# Patient Record
Sex: FEMALE | Race: OTHER | HISPANIC OR LATINO
[De-identification: names, ages, dates, MRNs, and addresses within clinical notes are randomized per-mention and may not be internally consistent; named-entity substitution may affect disease eponyms.]

---

## 2018-02-13 ENCOUNTER — APPOINTMENT (OUTPATIENT)
Dept: FAMILY MEDICINE | Facility: CLINIC | Age: 63
End: 2018-02-13
Payer: COMMERCIAL

## 2018-02-13 VITALS
DIASTOLIC BLOOD PRESSURE: 78 MMHG | HEIGHT: 64 IN | TEMPERATURE: 97.7 F | SYSTOLIC BLOOD PRESSURE: 121 MMHG | WEIGHT: 153 LBS | HEART RATE: 61 BPM | BODY MASS INDEX: 26.12 KG/M2 | OXYGEN SATURATION: 98 %

## 2018-02-13 DIAGNOSIS — Z83.3 FAMILY HISTORY OF DIABETES MELLITUS: ICD-10-CM

## 2018-02-13 DIAGNOSIS — K76.0 FATTY (CHANGE OF) LIVER, NOT ELSEWHERE CLASSIFIED: ICD-10-CM

## 2018-02-13 DIAGNOSIS — E66.9 OBESITY, UNSPECIFIED: ICD-10-CM

## 2018-02-13 DIAGNOSIS — Z82.0 FAMILY HISTORY OF EPILEPSY AND OTHER DISEASES OF THE NERVOUS SYSTEM: ICD-10-CM

## 2018-02-13 DIAGNOSIS — E66.3 OVERWEIGHT: ICD-10-CM

## 2018-02-13 DIAGNOSIS — F17.200 NICOTINE DEPENDENCE, UNSPECIFIED, UNCOMPLICATED: ICD-10-CM

## 2018-02-13 DIAGNOSIS — Z87.891 PERSONAL HISTORY OF NICOTINE DEPENDENCE: ICD-10-CM

## 2018-02-13 DIAGNOSIS — Z78.9 OTHER SPECIFIED HEALTH STATUS: ICD-10-CM

## 2018-02-13 PROCEDURE — 94010 BREATHING CAPACITY TEST: CPT

## 2018-02-13 PROCEDURE — G0008: CPT

## 2018-02-13 PROCEDURE — 99213 OFFICE O/P EST LOW 20 MIN: CPT | Mod: 25

## 2018-02-13 PROCEDURE — 99386 PREV VISIT NEW AGE 40-64: CPT | Mod: 25

## 2018-02-13 PROCEDURE — 93000 ELECTROCARDIOGRAM COMPLETE: CPT

## 2018-02-13 PROCEDURE — 90686 IIV4 VACC NO PRSV 0.5 ML IM: CPT

## 2018-02-14 PROBLEM — E66.9 OBESITY: Noted: 2018-02-13

## 2018-02-14 PROBLEM — E66.3 OVERWEIGHT (BMI 25.0-29.9): Status: ACTIVE | Noted: 2018-02-14

## 2018-03-10 ENCOUNTER — APPOINTMENT (OUTPATIENT)
Dept: CARDIOLOGY | Facility: CLINIC | Age: 63
End: 2018-03-10
Payer: COMMERCIAL

## 2018-03-10 VITALS
SYSTOLIC BLOOD PRESSURE: 130 MMHG | HEIGHT: 62 IN | RESPIRATION RATE: 16 BRPM | WEIGHT: 152 LBS | DIASTOLIC BLOOD PRESSURE: 80 MMHG | HEART RATE: 59 BPM | BODY MASS INDEX: 27.97 KG/M2

## 2018-03-10 PROCEDURE — 99244 OFF/OP CNSLTJ NEW/EST MOD 40: CPT

## 2018-03-10 PROCEDURE — 93000 ELECTROCARDIOGRAM COMPLETE: CPT

## 2018-03-27 ENCOUNTER — APPOINTMENT (OUTPATIENT)
Dept: INTERNAL MEDICINE | Facility: CLINIC | Age: 63
End: 2018-03-27
Payer: COMMERCIAL

## 2018-03-27 VITALS
OXYGEN SATURATION: 98 % | HEIGHT: 62 IN | BODY MASS INDEX: 27.97 KG/M2 | HEART RATE: 63 BPM | DIASTOLIC BLOOD PRESSURE: 70 MMHG | TEMPERATURE: 97.5 F | SYSTOLIC BLOOD PRESSURE: 118 MMHG | WEIGHT: 152 LBS

## 2018-03-27 LAB
BILIRUB UR QL STRIP: NEGATIVE
GLUCOSE UR-MCNC: NEGATIVE
HCG UR QL: 0.2 EU/DL
HGB UR QL STRIP.AUTO: NORMAL
KETONES UR-MCNC: NEGATIVE
LEUKOCYTE ESTERASE UR QL STRIP: NORMAL
NITRITE UR QL STRIP: NEGATIVE
PH UR STRIP: 5.5
PROT UR STRIP-MCNC: NEGATIVE
SP GR UR STRIP: 1.01

## 2018-03-27 PROCEDURE — 36415 COLL VENOUS BLD VENIPUNCTURE: CPT

## 2018-03-27 PROCEDURE — 81003 URINALYSIS AUTO W/O SCOPE: CPT | Mod: QW

## 2018-03-27 PROCEDURE — 99214 OFFICE O/P EST MOD 30 MIN: CPT | Mod: 25

## 2018-03-28 LAB
ALBUMIN SERPL ELPH-MCNC: 4.3 G/DL
ALP BLD-CCNC: 62 U/L
ALT SERPL-CCNC: 17 U/L
ANION GAP SERPL CALC-SCNC: 13 MMOL/L
APPEARANCE: CLEAR
AST SERPL-CCNC: 19 U/L
BACTERIA: ABNORMAL
BASOPHILS # BLD AUTO: 0.03 K/UL
BASOPHILS NFR BLD AUTO: 0.5 %
BILIRUB SERPL-MCNC: 0.5 MG/DL
BILIRUBIN URINE: NEGATIVE
BLOOD URINE: NEGATIVE
BUN SERPL-MCNC: 25 MG/DL
CALCIUM SERPL-MCNC: 9.2 MG/DL
CHLORIDE SERPL-SCNC: 101 MMOL/L
CO2 SERPL-SCNC: 25 MMOL/L
COLOR: YELLOW
CREAT SERPL-MCNC: 0.75 MG/DL
EOSINOPHIL # BLD AUTO: 0.13 K/UL
EOSINOPHIL NFR BLD AUTO: 2.1 %
ERYTHROCYTE [SEDIMENTATION RATE] IN BLOOD BY WESTERGREN METHOD: 15 MM/HR
GLUCOSE QUALITATIVE U: NEGATIVE MG/DL
GLUCOSE SERPL-MCNC: 89 MG/DL
HCT VFR BLD CALC: 41.3 %
HGB BLD-MCNC: 13.9 G/DL
HYALINE CASTS: 3 /LPF
IMM GRANULOCYTES NFR BLD AUTO: 0.5 %
KETONES URINE: NEGATIVE
LEUKOCYTE ESTERASE URINE: ABNORMAL
LYMPHOCYTES # BLD AUTO: 2.19 K/UL
LYMPHOCYTES NFR BLD AUTO: 35.2 %
MAN DIFF?: NORMAL
MCHC RBC-ENTMCNC: 30.3 PG
MCHC RBC-ENTMCNC: 33.7 GM/DL
MCV RBC AUTO: 90.2 FL
MICROSCOPIC-UA: NORMAL
MONOCYTES # BLD AUTO: 0.38 K/UL
MONOCYTES NFR BLD AUTO: 6.1 %
NEUTROPHILS # BLD AUTO: 3.46 K/UL
NEUTROPHILS NFR BLD AUTO: 55.6 %
NITRITE URINE: NEGATIVE
PH URINE: 5
PLATELET # BLD AUTO: 248 K/UL
POTASSIUM SERPL-SCNC: 4.5 MMOL/L
PROT SERPL-MCNC: 7.3 G/DL
PROTEIN URINE: NEGATIVE MG/DL
RBC # BLD: 4.58 M/UL
RBC # FLD: 13.4 %
RED BLOOD CELLS URINE: 1 /HPF
SODIUM SERPL-SCNC: 139 MMOL/L
SPECIFIC GRAVITY URINE: 1.01
SQUAMOUS EPITHELIAL CELLS: 4 /HPF
UROBILINOGEN URINE: NEGATIVE MG/DL
WBC # FLD AUTO: 6.22 K/UL
WHITE BLOOD CELLS URINE: 8 /HPF

## 2018-03-29 LAB — BACTERIA UR CULT: NORMAL

## 2018-04-02 ENCOUNTER — FORM ENCOUNTER (OUTPATIENT)
Age: 63
End: 2018-04-02

## 2018-04-03 ENCOUNTER — APPOINTMENT (OUTPATIENT)
Dept: CT IMAGING | Facility: CLINIC | Age: 63
End: 2018-04-03
Payer: COMMERCIAL

## 2018-04-03 ENCOUNTER — OUTPATIENT (OUTPATIENT)
Dept: OUTPATIENT SERVICES | Facility: HOSPITAL | Age: 63
LOS: 1 days | End: 2018-04-03
Payer: COMMERCIAL

## 2018-04-03 ENCOUNTER — APPOINTMENT (OUTPATIENT)
Dept: CARDIOLOGY | Facility: CLINIC | Age: 63
End: 2018-04-03
Payer: COMMERCIAL

## 2018-04-03 DIAGNOSIS — R91.8 OTHER NONSPECIFIC ABNORMAL FINDING OF LUNG FIELD: ICD-10-CM

## 2018-04-03 DIAGNOSIS — R10.31 RIGHT LOWER QUADRANT PAIN: ICD-10-CM

## 2018-04-03 DIAGNOSIS — R10.813 RIGHT LOWER QUADRANT ABDOMINAL TENDERNESS: ICD-10-CM

## 2018-04-03 PROCEDURE — 78452 HT MUSCLE IMAGE SPECT MULT: CPT

## 2018-04-03 PROCEDURE — 74177 CT ABD & PELVIS W/CONTRAST: CPT | Mod: 26

## 2018-04-03 PROCEDURE — 71260 CT THORAX DX C+: CPT

## 2018-04-03 PROCEDURE — 71260 CT THORAX DX C+: CPT | Mod: 26

## 2018-04-03 PROCEDURE — 93015 CV STRESS TEST SUPVJ I&R: CPT

## 2018-04-03 PROCEDURE — 74177 CT ABD & PELVIS W/CONTRAST: CPT

## 2018-04-03 PROCEDURE — A9500: CPT

## 2018-04-05 RX ORDER — KIT FOR THE PREPARATION OF TECHNETIUM TC99M SESTAMIBI 1 MG/5ML
INJECTION, POWDER, LYOPHILIZED, FOR SOLUTION PARENTERAL
Refills: 0 | Status: COMPLETED | OUTPATIENT
Start: 2018-04-05

## 2018-04-05 RX ADMIN — KIT FOR THE PREPARATION OF TECHNETIUM TC99M SESTAMIBI 0: 1 INJECTION, POWDER, LYOPHILIZED, FOR SOLUTION PARENTERAL at 00:00

## 2018-04-11 ENCOUNTER — APPOINTMENT (OUTPATIENT)
Dept: PULMONOLOGY | Facility: CLINIC | Age: 63
End: 2018-04-11
Payer: COMMERCIAL

## 2018-04-11 ENCOUNTER — APPOINTMENT (OUTPATIENT)
Dept: INTERNAL MEDICINE | Facility: CLINIC | Age: 63
End: 2018-04-11

## 2018-04-11 VITALS
BODY MASS INDEX: 28.7 KG/M2 | DIASTOLIC BLOOD PRESSURE: 70 MMHG | HEART RATE: 74 BPM | HEIGHT: 61.5 IN | WEIGHT: 154 LBS | SYSTOLIC BLOOD PRESSURE: 130 MMHG | OXYGEN SATURATION: 98 %

## 2018-04-11 PROCEDURE — 94010 BREATHING CAPACITY TEST: CPT

## 2018-04-11 PROCEDURE — 99205 OFFICE O/P NEW HI 60 MIN: CPT | Mod: 25

## 2018-07-02 ENCOUNTER — APPOINTMENT (OUTPATIENT)
Dept: CARDIOLOGY | Facility: CLINIC | Age: 63
End: 2018-07-02

## 2018-09-21 ENCOUNTER — LABORATORY RESULT (OUTPATIENT)
Age: 63
End: 2018-09-21

## 2018-09-21 ENCOUNTER — NON-APPOINTMENT (OUTPATIENT)
Age: 63
End: 2018-09-21

## 2018-09-21 ENCOUNTER — APPOINTMENT (OUTPATIENT)
Dept: INTERNAL MEDICINE | Facility: CLINIC | Age: 63
End: 2018-09-21
Payer: COMMERCIAL

## 2018-09-21 VITALS
BODY MASS INDEX: 29.64 KG/M2 | HEART RATE: 74 BPM | TEMPERATURE: 97.7 F | DIASTOLIC BLOOD PRESSURE: 76 MMHG | HEIGHT: 61 IN | OXYGEN SATURATION: 96 % | SYSTOLIC BLOOD PRESSURE: 120 MMHG | WEIGHT: 157 LBS

## 2018-09-21 PROCEDURE — 99214 OFFICE O/P EST MOD 30 MIN: CPT | Mod: 25

## 2018-09-21 PROCEDURE — 36415 COLL VENOUS BLD VENIPUNCTURE: CPT

## 2018-09-21 PROCEDURE — 93000 ELECTROCARDIOGRAM COMPLETE: CPT

## 2018-09-21 PROCEDURE — G0444 DEPRESSION SCREEN ANNUAL: CPT

## 2018-09-22 LAB
25(OH)D3 SERPL-MCNC: 38 NG/ML
ALBUMIN SERPL ELPH-MCNC: 4.7 G/DL
ALP BLD-CCNC: 63 U/L
ALT SERPL-CCNC: 23 U/L
AMYLASE/CREAT SERPL: 94 U/L
ANION GAP SERPL CALC-SCNC: 13 MMOL/L
APPEARANCE: CLEAR
AST SERPL-CCNC: 26 U/L
BACTERIA: NEGATIVE
BASOPHILS # BLD AUTO: 0.03 K/UL
BASOPHILS NFR BLD AUTO: 0.5 %
BILIRUB SERPL-MCNC: 0.3 MG/DL
BILIRUBIN URINE: NEGATIVE
BLOOD URINE: NEGATIVE
BUN SERPL-MCNC: 16 MG/DL
CALCIUM SERPL-MCNC: 9.4 MG/DL
CHLORIDE SERPL-SCNC: 103 MMOL/L
CK SERPL-CCNC: 135 U/L
CO2 SERPL-SCNC: 25 MMOL/L
COLOR: YELLOW
CREAT SERPL-MCNC: 0.63 MG/DL
EOSINOPHIL # BLD AUTO: 0.21 K/UL
EOSINOPHIL NFR BLD AUTO: 3.2 %
ERYTHROCYTE [SEDIMENTATION RATE] IN BLOOD BY WESTERGREN METHOD: 10 MM/HR
GLUCOSE QUALITATIVE U: NEGATIVE MG/DL
GLUCOSE SERPL-MCNC: 88 MG/DL
HCT VFR BLD CALC: 41.9 %
HGB BLD-MCNC: 13.5 G/DL
IMM GRANULOCYTES NFR BLD AUTO: 0.5 %
KETONES URINE: NEGATIVE
LEUKOCYTE ESTERASE URINE: ABNORMAL
LPL SERPL-CCNC: 24 U/L
LYMPHOCYTES # BLD AUTO: 2.77 K/UL
LYMPHOCYTES NFR BLD AUTO: 41.7 %
MAGNESIUM SERPL-MCNC: 2.2 MG/DL
MAN DIFF?: NORMAL
MCHC RBC-ENTMCNC: 29.6 PG
MCHC RBC-ENTMCNC: 32.2 GM/DL
MCV RBC AUTO: 91.9 FL
MICROSCOPIC-UA: NORMAL
MONOCYTES # BLD AUTO: 0.38 K/UL
MONOCYTES NFR BLD AUTO: 5.7 %
NEUTROPHILS # BLD AUTO: 3.22 K/UL
NEUTROPHILS NFR BLD AUTO: 48.4 %
NITRITE URINE: NEGATIVE
PH URINE: 5.5
PLATELET # BLD AUTO: 220 K/UL
POTASSIUM SERPL-SCNC: 4.4 MMOL/L
PROT SERPL-MCNC: 7.4 G/DL
PROTEIN URINE: NEGATIVE MG/DL
RBC # BLD: 4.56 M/UL
RBC # FLD: 13.5 %
RED BLOOD CELLS URINE: 0 /HPF
SODIUM SERPL-SCNC: 141 MMOL/L
SPECIFIC GRAVITY URINE: 1.01
SQUAMOUS EPITHELIAL CELLS: 3 /HPF
T PALLIDUM AB SER QL IA: NEGATIVE
T4 FREE SERPL-MCNC: 1.2 NG/DL
TSH SERPL-ACNC: 2.3 UIU/ML
UROBILINOGEN URINE: NEGATIVE MG/DL
WBC # FLD AUTO: 6.64 K/UL
WHITE BLOOD CELLS URINE: 10 /HPF

## 2018-09-22 NOTE — HISTORY OF PRESENT ILLNESS
[FreeTextEntry8] : Here for evaluation of abdominal discomfort and bloating after eating.  She states she has had sx for a long time but lately getting worse..  No nausea, vomiting, diarrhea or weight loss.  No fever/chills.  SHe has not taken anything for her sx.  She states she does have a hx of PUD but has not followed up with GI recently.  She states she gets so bloated sometimes that she feels a little shortness of breath.  She denies chest pain, palpitations, or lower extremity edema.  She does report a lot of fatigue\par \par She states she has been getting some leg cramps at night especially in her left upper inner thigh.  She describes pain as twisting.  She denies trauma.\par \par She is here today with her

## 2018-09-22 NOTE — COUNSELING
[Behavioral health counseling provided] : behavioral health  [None] : None [Good understanding] : Patient has a good understanding of lifestyle changes and the steps needed to achieve self management goals [ - Behavioral Counseling for Obesity (Face-to-Face for 15 Minutes)] : Behavioral Counseling for Obesity (Face-to-Face for 15 Minutes) [ - Annual Depression Screening] : Annual Depression Screening

## 2018-09-22 NOTE — ASSESSMENT
[FreeTextEntry1] : \par Abdominal pain/bloating:\par -Will check labs\par -I adv she check H pylori breath test at lab\par -Will start omeprazole 40mg PO daily-she will start after doing breath test at lab\par -I have adv she get CT abd/pelvis\par -will refer to GI as she may need EGD\par -she is to notify me if sx persist or worsen\par -if sx worsen she should go to ER\par \par Fatigue/SOB with abdominal bloating:\par -EKG done today-sinus bradycardia at 56, no ST abnormaoities\par -will check labs\par -depression could be contributing\par \par Depression:\par -PHQ 9 score 8\par -she states she occasionally feels depressed but sx are mild\par -tx options discussed-initially she declined any tx\par -her  encouraged her to meet with pt-[facility] and I agreed this may be good idea\par -she will be referred to CM\par -she is to notify office if sx persist or worsen\par -f/u 4 weeks\par -counseling time 15 minutes\par \par Muscle cramps:\par -I advised she stay hydrated\par -will check labs\par \par Dyslipidemia:\par -her 10 year risk of ASCVD was 4.88\par -I advised low fat/low cholesterol diet\par -will repeat fasting lipids at next visit (she was not fasting today)\par \par STREETER/SOB/chest pain:\par -she reports she was told she had emphysema in the past\par -spirometry was normal\par -she was seen by cardiology and ECHO and nuclear stress test was ordered\par -she was referred to Pulmonary-she has appt 2018\par -if she gets chest pain or shortness of breath she is to call 911 and go to ER\par \par Pulmonary nodules:\par -recent CT chest showed small B/L nodules 3 mm\par \par Osteopenia:\par -DEXA 2017-osteopenia\par -she should be taking calcium + Vitamin D\par \par Overweight:\par -I adv low fat/low cholesterol diet and weight loss\par -she has hx of fatty liver\par \par Uterine fibroid:\par -referred to GYN for annual exam\par \par She reports hx of gastric ulcer in past\par -she states she had EGD is past\par -i adv she avoid meloxicam\par -I adv she see GI\par -I advised H pylori breath test\par \par HCM:\par \par CPE 2018\par \par EKG 2018\par \par Depression screenin2018 PHQ 9 score 8\par \par Flu shot:  advised today-she declined 2018\par \par Pneumovax: advised previously\par \par Tdap: advised previously\par \par Shingles vaccine: advised previously\par \par HIV testing offered: she declined 2018\par \par Hepatitis C screening: offered-she declined 2018\par \par Mammogram: 2018 BR 1\par \par GYN: referred  for annual exam previously\par \par DEXA: 2017-osteopenia\par \par Colonoscopy: 2017-Dr Vidal-she states benign colon polyps found\par \par F/U 4 weeks

## 2018-09-22 NOTE — PHYSICAL EXAM
[No Acute Distress] : no acute distress [Well Nourished] : well nourished [Well Developed] : well developed [Well-Appearing] : well-appearing [Normal Voice/Communication] : normal voice/communication [Normal Sclera/Conjunctiva] : normal sclera/conjunctiva [PERRL] : pupils equal round and reactive to light [Normal Oropharynx] : the oropharynx was normal [No JVD] : no jugular venous distention [Supple] : supple [No Lymphadenopathy] : no lymphadenopathy [Thyroid Normal, No Nodules] : the thyroid was normal and there were no nodules present [No Respiratory Distress] : no respiratory distress  [Clear to Auscultation] : lungs were clear to auscultation bilaterally [No Accessory Muscle Use] : no accessory muscle use [Normal Rate] : normal rate  [Regular Rhythm] : with a regular rhythm [Normal S1, S2] : normal S1 and S2 [No Murmur] : no murmur heard [No Abdominal Bruit] : a ~M bruit was not heard ~T in the abdomen [Pedal Pulses Present] : the pedal pulses are present [No Edema] : there was no peripheral edema [No Extremity Clubbing/Cyanosis] : no extremity clubbing/cyanosis [No Palpable Aorta] : no palpable aorta [Soft] : abdomen soft [Non Tender] : non-tender [Non-distended] : non-distended [No Masses] : no abdominal mass palpated [No HSM] : no HSM [Normal Bowel Sounds] : normal bowel sounds [No CVA Tenderness] : no CVA  tenderness [No Spinal Tenderness] : no spinal tenderness [No Joint Swelling] : no joint swelling [No Rash] : no rash [No Skin Lesions] : no skin lesions [No Focal Deficits] : no focal deficits [Normal Affect] : the affect was normal [Alert and Oriented x3] : oriented to person, place, and time [Normal Mood] : the mood was normal

## 2018-09-22 NOTE — REVIEW OF SYSTEMS
[Fatigue] : fatigue [Abdominal Pain] : abdominal pain [Depression] : depression [Negative] : Neurological [Fever] : no fever [Chills] : no chills [Recent Change In Weight] : ~T no recent weight change [Earache] : no earache [Nasal Discharge] : no nasal discharge [Sore Throat] : no sore throat [Chest Pain] : no chest pain [Palpitations] : no palpitations [Leg Claudication] : no leg claudication [Lower Ext Edema] : no lower extremity edema [Wheezing] : no wheezing [Cough] : no cough [Dyspnea on Exertion] : no dyspnea on exertion [Nausea] : no nausea [Constipation] : no constipation [Diarrhea] : diarrhea [Vomiting] : no vomiting [Heartburn] : no heartburn [Melena] : no melena [Skin Rash] : no skin rash [Anxiety] : no anxiety [FreeTextEntry6] : see HPI [FreeTextEntry7] : see HPI [FreeTextEntry9] : see HPI

## 2018-09-24 LAB
ANA PAT FLD IF-IMP: ABNORMAL
ANACR T: ABNORMAL

## 2018-10-07 ENCOUNTER — FORM ENCOUNTER (OUTPATIENT)
Age: 63
End: 2018-10-07

## 2018-10-08 ENCOUNTER — OUTPATIENT (OUTPATIENT)
Dept: OUTPATIENT SERVICES | Facility: HOSPITAL | Age: 63
LOS: 1 days | End: 2018-10-08
Payer: COMMERCIAL

## 2018-10-08 ENCOUNTER — APPOINTMENT (OUTPATIENT)
Dept: CT IMAGING | Facility: CLINIC | Age: 63
End: 2018-10-08
Payer: COMMERCIAL

## 2018-10-08 DIAGNOSIS — R14.0 ABDOMINAL DISTENSION (GASEOUS): ICD-10-CM

## 2018-10-08 DIAGNOSIS — R10.9 UNSPECIFIED ABDOMINAL PAIN: ICD-10-CM

## 2018-10-08 PROCEDURE — 74177 CT ABD & PELVIS W/CONTRAST: CPT | Mod: 26

## 2018-10-08 PROCEDURE — 82565 ASSAY OF CREATININE: CPT

## 2018-10-08 PROCEDURE — 74177 CT ABD & PELVIS W/CONTRAST: CPT

## 2018-10-19 ENCOUNTER — APPOINTMENT (OUTPATIENT)
Dept: INTERNAL MEDICINE | Facility: CLINIC | Age: 63
End: 2018-10-19

## 2018-12-10 ENCOUNTER — APPOINTMENT (OUTPATIENT)
Dept: GASTROENTEROLOGY | Facility: CLINIC | Age: 63
End: 2018-12-10
Payer: COMMERCIAL

## 2018-12-10 VITALS
WEIGHT: 156 LBS | RESPIRATION RATE: 14 BRPM | DIASTOLIC BLOOD PRESSURE: 78 MMHG | HEART RATE: 75 BPM | BODY MASS INDEX: 29.45 KG/M2 | HEIGHT: 61 IN | SYSTOLIC BLOOD PRESSURE: 118 MMHG

## 2018-12-10 PROCEDURE — 99204 OFFICE O/P NEW MOD 45 MIN: CPT

## 2018-12-10 PROCEDURE — 82272 OCCULT BLD FECES 1-3 TESTS: CPT

## 2018-12-18 ENCOUNTER — APPOINTMENT (OUTPATIENT)
Dept: INTERNAL MEDICINE | Facility: CLINIC | Age: 63
End: 2018-12-18

## 2019-03-04 ENCOUNTER — APPOINTMENT (OUTPATIENT)
Dept: GASTROENTEROLOGY | Facility: CLINIC | Age: 64
End: 2019-03-04

## 2019-07-24 ENCOUNTER — MEDICATION RENEWAL (OUTPATIENT)
Age: 64
End: 2019-07-24

## 2019-09-02 ENCOUNTER — FORM ENCOUNTER (OUTPATIENT)
Age: 64
End: 2019-09-02

## 2019-09-03 ENCOUNTER — OUTPATIENT (OUTPATIENT)
Dept: OUTPATIENT SERVICES | Facility: HOSPITAL | Age: 64
LOS: 1 days | End: 2019-09-03
Payer: COMMERCIAL

## 2019-09-03 ENCOUNTER — NON-APPOINTMENT (OUTPATIENT)
Age: 64
End: 2019-09-03

## 2019-09-03 ENCOUNTER — APPOINTMENT (OUTPATIENT)
Dept: RADIOLOGY | Facility: CLINIC | Age: 64
End: 2019-09-03
Payer: COMMERCIAL

## 2019-09-03 ENCOUNTER — APPOINTMENT (OUTPATIENT)
Dept: INTERNAL MEDICINE | Facility: CLINIC | Age: 64
End: 2019-09-03
Payer: COMMERCIAL

## 2019-09-03 ENCOUNTER — LABORATORY RESULT (OUTPATIENT)
Age: 64
End: 2019-09-03

## 2019-09-03 VITALS
SYSTOLIC BLOOD PRESSURE: 124 MMHG | OXYGEN SATURATION: 97 % | HEIGHT: 61 IN | WEIGHT: 157 LBS | TEMPERATURE: 98.1 F | DIASTOLIC BLOOD PRESSURE: 70 MMHG | HEART RATE: 63 BPM | RESPIRATION RATE: 14 BRPM | BODY MASS INDEX: 29.64 KG/M2

## 2019-09-03 DIAGNOSIS — R10.813 RIGHT LOWER QUADRANT ABDOMINAL TENDERNESS: ICD-10-CM

## 2019-09-03 DIAGNOSIS — Z92.29 PERSONAL HISTORY OF OTHER DRUG THERAPY: ICD-10-CM

## 2019-09-03 DIAGNOSIS — M54.6 PAIN IN THORACIC SPINE: ICD-10-CM

## 2019-09-03 PROCEDURE — 72100 X-RAY EXAM L-S SPINE 2/3 VWS: CPT | Mod: 26

## 2019-09-03 PROCEDURE — 72070 X-RAY EXAM THORAC SPINE 2VWS: CPT | Mod: 26

## 2019-09-03 PROCEDURE — 99396 PREV VISIT EST AGE 40-64: CPT | Mod: 25

## 2019-09-03 PROCEDURE — 99213 OFFICE O/P EST LOW 20 MIN: CPT | Mod: 25

## 2019-09-03 PROCEDURE — 72070 X-RAY EXAM THORAC SPINE 2VWS: CPT

## 2019-09-03 PROCEDURE — 96127 BRIEF EMOTIONAL/BEHAV ASSMT: CPT

## 2019-09-03 PROCEDURE — 36415 COLL VENOUS BLD VENIPUNCTURE: CPT

## 2019-09-03 PROCEDURE — 93000 ELECTROCARDIOGRAM COMPLETE: CPT

## 2019-09-03 PROCEDURE — 72100 X-RAY EXAM L-S SPINE 2/3 VWS: CPT

## 2019-09-03 RX ORDER — AMOXICILLIN 500 MG/1
500 TABLET, FILM COATED ORAL
Qty: 56 | Refills: 0 | Status: DISCONTINUED | COMMUNITY
Start: 2018-10-12 | End: 2019-09-03

## 2019-09-03 RX ORDER — CLARITHROMYCIN 500 MG/1
500 TABLET, FILM COATED ORAL
Qty: 28 | Refills: 0 | Status: DISCONTINUED | COMMUNITY
Start: 2018-10-12 | End: 2019-09-03

## 2019-09-03 RX ORDER — OMEPRAZOLE 40 MG/1
40 CAPSULE, DELAYED RELEASE ORAL
Qty: 28 | Refills: 0 | Status: DISCONTINUED | COMMUNITY
Start: 2018-09-21 | End: 2019-09-03

## 2019-09-03 NOTE — HISTORY OF PRESENT ILLNESS
[FreeTextEntry1] : CPE [de-identified] : Here for CPE. She is accompanied by her  today. She came in fasting. \par \par She states that she never followed back up with GI.   She states TOM ordered H pylori stool antigen test as she did not take PPI during her H pylori treatment.  She states she had h pylori stool test done at [facility] but states she is unsure of results\par \par She notes she occasionally feels depressed. She was previously referred to [facility] but never made appt. No SI/HI.  She states she is interested in meeting with [facility].  She does not want meds\par \par She reports that she was seen by cardiologist Dr. Rodriguez for STREETER/SOB/chest pain and work up was normal. \par \par She states chronic mid back/lower back pain has returned. She notes she had an MRI done before which showed a herniated disc. She states that she used to get injections for the pain which helped. She takes Advil or meloxicam prn which sometimes alleviates pain. She denies injuries or trauma. She denies radiation down legs, leg weakness, or parasthesias\par

## 2019-09-03 NOTE — ASSESSMENT
[FreeTextEntry1] : Chronic back pain:\par -will refer to PT\par -will renew meloxicam 15 mg PO daily prn-I advised she take sporadically-she reports she takes rarely  (R/B/A/side effects discussed)\par -will order x-ray of L spine and T spine \par -will check labs\par -she is to notify office if sx persist or worsen\par \par H pylori infection\par -she appears to have hx of gastric ulcer in past\par -she appears to have taken antibiotics but did not take PPI with tx\par -she was seen by GI and states she had h pylori stool antigen test done-will try to get results\par \par Fatigue/SOB\par -she states she was seen by cardiology and work up was negative\par -will call for cardiology consult note\par \par Depression:\par -PHQ 9 score 11\par -Tx options discussed\par -she does not want meds\par -her  encouraged her to meet with pt-[facility] and I agreed this may be good idea\par -she will be referred to [facility]\par -she is to notify office if sx persist or worsen\par -f/u 4 weeks\par \par Dyslipidemia:\par -will check fasting lipids\par \par STREETER/SOB/chest pain:\par -she states sx have resolved\par -she states she was sen by cardiology and work up was negative-will call for consult note\par \par Pulmonary nodules:\par -last CT chest showed small B/L nodules 3 mm\par -given smoking hx will order f/u  CT chest \par \par Osteopenia:\par -DEXA 2017-osteopenia\par -will order DEXA\par -calcium + Vitamin D advised\par \par Overweight:\par -BMI 29\par -I adv low fat/low cholesterol diet and weight loss\par -she has hx of fatty liver\par \par Uterine fibroid:\par -referred to GYN for annual exam\par \par \par HCM:\par \par CPE 9/3/2019\par \par EKG 9/3/2019 sinus bradycardia at 56, low volatge, no ST abnormalities\par \par Depression screenin/3/2019 PHQ 9 score 11\par \par Flu shot:  advised she get in fall\par \par Pneumovax: advised- she declined\par \par Tdap: advised- she declined\par \par Shingles vaccine: advised- she declined\par \par HIV testing offered: she declined 9/3/2019\par \par Hepatitis C screening: will check today\par \par Mammogram: 2018 BR 1-will refer today for annual mammogram\par \par GYN: referred today for annual exam \par \par DEXA: 2017-osteopenia- 9/3/2019 ordered today \par \par Colonoscopy: 2017-Dr Vidal-she states benign colon polyps found\par \par F/U 3 months. Labs drawn in office today.\par

## 2019-09-03 NOTE — COUNSELING
[Behavioral health counseling provided] : Behavioral health counseling provided [Benefits of weight loss discussed] : Benefits of weight loss discussed [Potential consequences of obesity discussed] : Potential consequences of obesity discussed [Good understanding] : Patient has a good understanding of disease, goals and obesity follow-up plan

## 2019-09-03 NOTE — HEALTH RISK ASSESSMENT
[No] : In the past 12 months have you used drugs other than those required for medical reasons? No [Unemployed] : unemployed [] :  [No falls in past year] : Patient reported no falls in the past year [HIV test declined] : HIV test declined [With Significant Other] : lives with significant other [de-identified] : Former smoker  [] : No [[id number]] : 11 [YearQuit] : 2015 [Change in mental status noted] : No change in mental status noted

## 2019-09-03 NOTE — REVIEW OF SYSTEMS
[Depression] : depression [Back Pain] : back pain [Negative] : Heme/Lymph [Fever] : no fever [Fatigue] : no fatigue [Chills] : no chills [Recent Change In Weight] : ~T no recent weight change [Chest Pain] : no chest pain [Palpitations] : no palpitations [Leg Claudication] : no leg claudication [Shortness Of Breath] : no shortness of breath [Lower Ext Edema] : no lower extremity edema [Wheezing] : no wheezing [Dyspnea on Exertion] : no dyspnea on exertion [Cough] : no cough [Abdominal Pain] : no abdominal pain [Nausea] : no nausea [Diarrhea] : diarrhea [Vomiting] : no vomiting [Suicidal] : not suicidal [Skin Rash] : no skin rash [Anxiety] : no anxiety [Insomnia] : no insomnia

## 2019-09-03 NOTE — END OF VISIT
[FreeTextEntry3] : All medical entries made by the Scribe were at my, Dr. Aristides An's, direction and personally dictated by me on [9/3/2019]. I have reviewed the chart and agree that the record accurately reflects my personal performance of the history, physical exam, assessment and plan. I have also personally directed, reviewed, and agreed with the chart.\par

## 2019-09-03 NOTE — PHYSICAL EXAM
[No Acute Distress] : no acute distress [Well Nourished] : well nourished [Well Developed] : well developed [Well-Appearing] : well-appearing [Normal Voice/Communication] : normal voice/communication [PERRL] : pupils equal round and reactive to light [Normal Sclera/Conjunctiva] : normal sclera/conjunctiva [Normal Oropharynx] : the oropharynx was normal [No JVD] : no jugular venous distention [Supple] : supple [No Lymphadenopathy] : no lymphadenopathy [Thyroid Normal, No Nodules] : the thyroid was normal and there were no nodules present [No Respiratory Distress] : no respiratory distress  [Clear to Auscultation] : lungs were clear to auscultation bilaterally [Normal Rate] : normal rate  [No Accessory Muscle Use] : no accessory muscle use [Regular Rhythm] : with a regular rhythm [Normal S1, S2] : normal S1 and S2 [No Murmur] : no murmur heard [No Abdominal Bruit] : a ~M bruit was not heard ~T in the abdomen [No Edema] : there was no peripheral edema [No Extremity Clubbing/Cyanosis] : no extremity clubbing/cyanosis [No Palpable Aorta] : no palpable aorta [Soft] : abdomen soft [Non Tender] : non-tender [Non-distended] : non-distended [No Masses] : no abdominal mass palpated [No HSM] : no HSM [Normal Bowel Sounds] : normal bowel sounds [No CVA Tenderness] : no CVA  tenderness [No Spinal Tenderness] : no spinal tenderness [No Rash] : no rash [No Joint Swelling] : no joint swelling [No Skin Lesions] : no skin lesions [No Focal Deficits] : no focal deficits [Normal Affect] : the affect was normal [Alert and Oriented x3] : oriented to person, place, and time [Normal Mood] : the mood was normal [EOMI] : extraocular movements intact [Normal Outer Ear/Nose] : the outer ears and nose were normal in appearance [Normal Nasal Mucosa] : the nasal mucosa was normal [Normal TMs] : both tympanic membranes were normal [No Carotid Bruits] : no carotid bruits [Grossly Normal Strength/Tone] : grossly normal strength/tone [Normal Insight/Judgement] : insight and judgment were intact [Normal Gait] : normal gait [de-identified] : no calf tenderness [de-identified] : negative SLR B/L

## 2019-09-03 NOTE — ADDENDUM
[FreeTextEntry1] : I, Trupti Marlow, acted solely as a scribe for Dr. An on this date [9/3/2019].\par

## 2019-09-08 LAB
25(OH)D3 SERPL-MCNC: 27.8 NG/ML
ALBUMIN SERPL ELPH-MCNC: 4.6 G/DL
ALP BLD-CCNC: 67 U/L
ALT SERPL-CCNC: 28 U/L
ANION GAP SERPL CALC-SCNC: 12 MMOL/L
APPEARANCE: CLEAR
AST SERPL-CCNC: 24 U/L
BACTERIA: NEGATIVE
BASOPHILS # BLD AUTO: 0.03 K/UL
BASOPHILS NFR BLD AUTO: 0.5 %
BILIRUB SERPL-MCNC: 0.4 MG/DL
BILIRUBIN URINE: NEGATIVE
BLOOD URINE: NEGATIVE
BUN SERPL-MCNC: 18 MG/DL
CALCIUM SERPL-MCNC: 9.4 MG/DL
CHLORIDE SERPL-SCNC: 102 MMOL/L
CHOLEST SERPL-MCNC: 260 MG/DL
CHOLEST/HDLC SERPL: 5.8 RATIO
CO2 SERPL-SCNC: 27 MMOL/L
COLOR: NORMAL
CREAT SERPL-MCNC: 0.68 MG/DL
EOSINOPHIL # BLD AUTO: 0.23 K/UL
EOSINOPHIL NFR BLD AUTO: 4.1 %
ERYTHROCYTE [SEDIMENTATION RATE] IN BLOOD BY WESTERGREN METHOD: 14 MM/HR
ESTIMATED AVERAGE GLUCOSE: 111 MG/DL
GLUCOSE QUALITATIVE U: NEGATIVE
GLUCOSE SERPL-MCNC: 102 MG/DL
HBA1C MFR BLD HPLC: 5.5 %
HCT VFR BLD CALC: 45.4 %
HCV AB SER QL: ABNORMAL
HCV S/CO RATIO: 1.06 S/CO
HDLC SERPL-MCNC: 45 MG/DL
HGB BLD-MCNC: 14.3 G/DL
HYALINE CASTS: 1 /LPF
IMM GRANULOCYTES NFR BLD AUTO: 0.4 %
KETONES URINE: NEGATIVE
LDLC SERPL CALC-MCNC: 154 MG/DL
LEUKOCYTE ESTERASE URINE: NEGATIVE
LYMPHOCYTES # BLD AUTO: 2.08 K/UL
LYMPHOCYTES NFR BLD AUTO: 37.3 %
MAN DIFF?: NORMAL
MCHC RBC-ENTMCNC: 30.6 PG
MCHC RBC-ENTMCNC: 31.5 GM/DL
MCV RBC AUTO: 97.2 FL
MICROSCOPIC-UA: NORMAL
MONOCYTES # BLD AUTO: 0.44 K/UL
MONOCYTES NFR BLD AUTO: 7.9 %
NEUTROPHILS # BLD AUTO: 2.78 K/UL
NEUTROPHILS NFR BLD AUTO: 49.8 %
NITRITE URINE: NEGATIVE
PH URINE: 5.5
PLATELET # BLD AUTO: 234 K/UL
POTASSIUM SERPL-SCNC: 4.7 MMOL/L
PROT SERPL-MCNC: 6.8 G/DL
PROTEIN URINE: NEGATIVE
RBC # BLD: 4.67 M/UL
RBC # FLD: 13.1 %
RED BLOOD CELLS URINE: 0 /HPF
SODIUM SERPL-SCNC: 141 MMOL/L
SPECIFIC GRAVITY URINE: 1.02
SQUAMOUS EPITHELIAL CELLS: 4 /HPF
T4 FREE SERPL-MCNC: 1 NG/DL
TRIGL SERPL-MCNC: 307 MG/DL
TSH SERPL-ACNC: 1.81 UIU/ML
UROBILINOGEN URINE: NORMAL
WBC # FLD AUTO: 5.58 K/UL
WHITE BLOOD CELLS URINE: 4 /HPF

## 2019-09-09 ENCOUNTER — FORM ENCOUNTER (OUTPATIENT)
Age: 64
End: 2019-09-09

## 2019-09-09 RX ORDER — MULTIVIT-MIN/FOLIC/VIT K/LYCOP 400-300MCG
25 MCG TABLET ORAL DAILY
Qty: 30 | Refills: 5 | Status: ACTIVE | COMMUNITY
Start: 2019-09-09 | End: 1900-01-01

## 2019-09-10 ENCOUNTER — APPOINTMENT (OUTPATIENT)
Dept: MAMMOGRAPHY | Facility: CLINIC | Age: 64
End: 2019-09-10
Payer: COMMERCIAL

## 2019-09-10 ENCOUNTER — APPOINTMENT (OUTPATIENT)
Dept: CT IMAGING | Facility: CLINIC | Age: 64
End: 2019-09-10
Payer: COMMERCIAL

## 2019-09-10 ENCOUNTER — APPOINTMENT (OUTPATIENT)
Dept: ULTRASOUND IMAGING | Facility: CLINIC | Age: 64
End: 2019-09-10
Payer: COMMERCIAL

## 2019-09-10 ENCOUNTER — OUTPATIENT (OUTPATIENT)
Dept: OUTPATIENT SERVICES | Facility: HOSPITAL | Age: 64
LOS: 1 days | End: 2019-09-10
Payer: COMMERCIAL

## 2019-09-10 ENCOUNTER — LABORATORY RESULT (OUTPATIENT)
Age: 64
End: 2019-09-10

## 2019-09-10 DIAGNOSIS — M54.5 LOW BACK PAIN: ICD-10-CM

## 2019-09-10 DIAGNOSIS — Z00.00 ENCOUNTER FOR GENERAL ADULT MEDICAL EXAMINATION WITHOUT ABNORMAL FINDINGS: ICD-10-CM

## 2019-09-10 DIAGNOSIS — R91.8 OTHER NONSPECIFIC ABNORMAL FINDING OF LUNG FIELD: ICD-10-CM

## 2019-09-10 PROCEDURE — 71250 CT THORAX DX C-: CPT | Mod: 26

## 2019-09-10 PROCEDURE — 77080 DXA BONE DENSITY AXIAL: CPT | Mod: 26

## 2019-09-10 PROCEDURE — 77080 DXA BONE DENSITY AXIAL: CPT

## 2019-09-10 PROCEDURE — 77063 BREAST TOMOSYNTHESIS BI: CPT

## 2019-09-10 PROCEDURE — 77067 SCR MAMMO BI INCL CAD: CPT

## 2019-09-10 PROCEDURE — 77067 SCR MAMMO BI INCL CAD: CPT | Mod: 26

## 2019-09-10 PROCEDURE — 77063 BREAST TOMOSYNTHESIS BI: CPT | Mod: 26

## 2019-09-10 PROCEDURE — 71250 CT THORAX DX C-: CPT

## 2019-09-13 ENCOUNTER — RESULT REVIEW (OUTPATIENT)
Age: 64
End: 2019-09-13

## 2019-09-14 LAB
ALBUMIN SERPL ELPH-MCNC: 4.7 G/DL
ALP BLD-CCNC: 66 U/L
ALT SERPL-CCNC: 27 U/L
AST SERPL-CCNC: 25 U/L
BILIRUB DIRECT SERPL-MCNC: 0.1 MG/DL
BILIRUB INDIRECT SERPL-MCNC: 0.4 MG/DL
BILIRUB SERPL-MCNC: 0.5 MG/DL
CHOLEST SERPL-MCNC: 264 MG/DL
CHOLEST/HDLC SERPL: 5.6 RATIO
HCV AB SER QL: ABNORMAL
HCV S/CO RATIO: 1.04 S/CO
HDLC SERPL-MCNC: 47 MG/DL
LDLC SERPL CALC-MCNC: 184 MG/DL
PROT SERPL-MCNC: 7 G/DL
TRIGL SERPL-MCNC: 165 MG/DL

## 2019-09-17 ENCOUNTER — APPOINTMENT (OUTPATIENT)
Dept: FAMILY MEDICINE | Facility: CLINIC | Age: 64
End: 2019-09-17

## 2019-09-17 ENCOUNTER — RESULT REVIEW (OUTPATIENT)
Age: 64
End: 2019-09-17

## 2019-10-01 ENCOUNTER — APPOINTMENT (OUTPATIENT)
Dept: FAMILY MEDICINE | Facility: CLINIC | Age: 64
End: 2019-10-01

## 2019-10-01 ENCOUNTER — FORM ENCOUNTER (OUTPATIENT)
Age: 64
End: 2019-10-01

## 2019-10-02 ENCOUNTER — OUTPATIENT (OUTPATIENT)
Dept: OUTPATIENT SERVICES | Facility: HOSPITAL | Age: 64
LOS: 1 days | End: 2019-10-02
Payer: COMMERCIAL

## 2019-10-02 ENCOUNTER — APPOINTMENT (OUTPATIENT)
Dept: CT IMAGING | Facility: CLINIC | Age: 64
End: 2019-10-02
Payer: COMMERCIAL

## 2019-10-02 DIAGNOSIS — M54.5 LOW BACK PAIN: ICD-10-CM

## 2019-10-02 PROCEDURE — 74177 CT ABD & PELVIS W/CONTRAST: CPT | Mod: 26

## 2019-10-02 PROCEDURE — 74177 CT ABD & PELVIS W/CONTRAST: CPT

## 2019-10-02 PROCEDURE — 82565 ASSAY OF CREATININE: CPT

## 2019-10-21 ENCOUNTER — APPOINTMENT (OUTPATIENT)
Dept: GASTROENTEROLOGY | Facility: CLINIC | Age: 64
End: 2019-10-21

## 2019-11-05 ENCOUNTER — APPOINTMENT (OUTPATIENT)
Dept: FAMILY MEDICINE | Facility: CLINIC | Age: 64
End: 2019-11-05

## 2019-12-03 ENCOUNTER — APPOINTMENT (OUTPATIENT)
Dept: INTERNAL MEDICINE | Facility: CLINIC | Age: 64
End: 2019-12-03
Payer: COMMERCIAL

## 2019-12-03 VITALS
DIASTOLIC BLOOD PRESSURE: 75 MMHG | WEIGHT: 161 LBS | TEMPERATURE: 98.5 F | HEART RATE: 65 BPM | HEIGHT: 61 IN | OXYGEN SATURATION: 99 % | SYSTOLIC BLOOD PRESSURE: 123 MMHG | BODY MASS INDEX: 30.4 KG/M2

## 2019-12-03 DIAGNOSIS — R91.8 OTHER NONSPECIFIC ABNORMAL FINDING OF LUNG FIELD: ICD-10-CM

## 2019-12-03 DIAGNOSIS — D25.9 LEIOMYOMA OF UTERUS, UNSPECIFIED: ICD-10-CM

## 2019-12-03 DIAGNOSIS — R76.8 OTHER SPECIFIED ABNORMAL IMMUNOLOGICAL FINDINGS IN SERUM: ICD-10-CM

## 2019-12-03 DIAGNOSIS — F32.9 MAJOR DEPRESSIVE DISORDER, SINGLE EPISODE, UNSPECIFIED: ICD-10-CM

## 2019-12-03 PROCEDURE — 90682 RIV4 VACC RECOMBINANT DNA IM: CPT

## 2019-12-03 PROCEDURE — 90471 IMMUNIZATION ADMIN: CPT

## 2019-12-03 PROCEDURE — 99214 OFFICE O/P EST MOD 30 MIN: CPT | Mod: 25

## 2019-12-03 RX ORDER — MELOXICAM 15 MG/1
15 TABLET ORAL
Qty: 90 | Refills: 0 | Status: ACTIVE | COMMUNITY
Start: 2017-06-19 | End: 1900-01-01

## 2019-12-03 NOTE — ADDENDUM
[FreeTextEntry1] : I, Trupti Marlow, acted solely as a scribe for Dr. An on this date [12/3/2019].\par

## 2019-12-03 NOTE — HISTORY OF PRESENT ILLNESS
[FreeTextEntry1] : Follow up  [de-identified] : Here for follow up.\par \par She states that she would like a referral to a nutritionist. \par \par She states chronic mid back/lower back pain persists. She has not gone to physical therapy and states that this is something that she has to just learn to live with. She denies radiation down legs, leg weakness, or paraesthesias.\par \par In regards to her depression, she states that she is feeling better. She has been enjoying speaking with [facility] and feels like it has been helping. She also notes that she answered the depression screening incorrectly. No SI/HI. \par \par She would like flu shot today and denies egg allergy. \par \par

## 2019-12-03 NOTE — END OF VISIT
[FreeTextEntry3] : All medical entries made by the Scribe were at my, Dr. Aristides An's, direction and personally dictated by me on [12/3/2019]. I have reviewed the chart and agree that the record accurately reflects my personal performance of the history, physical exam, assessment and plan. I have also personally directed, reviewed, and agreed with the chart.\par

## 2019-12-03 NOTE — ASSESSMENT
[FreeTextEntry1] : \par Chronic back pain:\par -previously referred to PT-I again advised\par -will renew meloxicam 15 mg PO daily prn\par -x-ray of L spine and T spine revealed deg changes and OA\par -she is to notify office if sx persist or worsen\par \par H pylori infection\par -she appears to have hx of gastric ulcer in past\par -she appears to have taken antibiotics but did not take PPI with tx\par -she was seen by GI and states she had h pylori stool antigen test done-Stool antigen test was negative 2019\par \par Depression:\par -PHQ 9 score 15- she states she answered questions wrong \par -she states she overall feels better\par -she was seen by [facility] \par -she is to notify office if sx persist or worsen\par \par Dyslipidemia:\par -on atorvastatin 10mg daily\par -will check fasting lipids\par \par Pulmonary nodules:\par -F/U chest CT showed small stable nodules and no further work up needed\par \par Osteopenia:\par -DEXA 2019-osteopenia\par -calcium + Vitamin D advised\par \par Obese\par -BMI 30\par -I adv low fat/low cholesterol diet and weight loss\par -she has hx of fatty liver\par -she requests referral to see nutritionist-will refer\par \par Uterine fibroid:\par -referred to GYN for annual exam-she states she has appt this month\par \par \par HCM:\par \par CPE 9/3/2019\par \par EKG 9/3/2019 \par \par Depression screenin2019 PHQ 9 score 15\par \par Flu shot: Advised.  No egg allergy. R/B discussed-VIS given.  Flu shot given 12/3/2019\par \par Pneumovax: advised- she declined\par \par Tdap: advised- she declined\par \par Shingles vaccine: advised- she declined\par \par HIV testing offered: she declined 9/3/2019\par \par Hepatitis C screening: She has + hepatitis C screening test but viral load was negative x 2-likely reprsents false + test vs old cleared infection\par \par Mammogram: 2019 BR 1\par \par GYN: referred for annual exam-she states she has appt this month \par \par DEXA: 2019-osteopenia\par \par Colonoscopy: 2017-Dr Vidal-she states benign colon polyps found\par \par F/U 3 months. She will have fasting labs done at the lab. \par

## 2019-12-03 NOTE — REVIEW OF SYSTEMS
[Back Pain] : back pain [Negative] : Neurological [Fever] : no fever [Fatigue] : no fatigue [Chills] : no chills [Palpitations] : no palpitations [Leg Claudication] : no leg claudication [Chest Pain] : no chest pain [Shortness Of Breath] : no shortness of breath [Wheezing] : no wheezing [Lower Ext Edema] : no lower extremity edema [Cough] : no cough [Dyspnea on Exertion] : no dyspnea on exertion [Abdominal Pain] : no abdominal pain [Diarrhea] : diarrhea [Nausea] : no nausea [Vomiting] : no vomiting [Skin Rash] : no skin rash [Suicidal] : not suicidal [de-identified] : see HPI

## 2019-12-03 NOTE — PHYSICAL EXAM
[EOMI] : extraocular movements intact [Normal Outer Ear/Nose] : the outer ears and nose were normal in appearance [Normal TMs] : both tympanic membranes were normal [Normal Nasal Mucosa] : the nasal mucosa was normal [No JVD] : no jugular venous distention [Supple] : supple [No Lymphadenopathy] : no lymphadenopathy [Thyroid Normal, No Nodules] : the thyroid was normal and there were no nodules present [No Respiratory Distress] : no respiratory distress  [Clear to Auscultation] : lungs were clear to auscultation bilaterally [No Accessory Muscle Use] : no accessory muscle use [Normal Rate] : normal rate  [Regular Rhythm] : with a regular rhythm [Normal S1, S2] : normal S1 and S2 [No Murmur] : no murmur heard [No Carotid Bruits] : no carotid bruits [No Palpable Aorta] : no palpable aorta [No CVA Tenderness] : no CVA  tenderness [No Joint Swelling] : no joint swelling [Grossly Normal Strength/Tone] : grossly normal strength/tone [Normal Gait] : normal gait [de-identified] : negative SLR B/L [No Acute Distress] : no acute distress [Well Developed] : well developed [Well Nourished] : well nourished [Well-Appearing] : well-appearing [Normal Voice/Communication] : normal voice/communication [Normal Sclera/Conjunctiva] : normal sclera/conjunctiva [PERRL] : pupils equal round and reactive to light [Normal Oropharynx] : the oropharynx was normal [Normal] : normal rate, regular rhythm, normal S1 and S2 and no murmur heard [No Abdominal Bruit] : a ~M bruit was not heard ~T in the abdomen [No Extremity Clubbing/Cyanosis] : no extremity clubbing/cyanosis [No Edema] : there was no peripheral edema [Non Tender] : non-tender [Soft] : abdomen soft [Non-distended] : non-distended [No Masses] : no abdominal mass palpated [No HSM] : no HSM [No Rash] : no rash [Normal Bowel Sounds] : normal bowel sounds [No Skin Lesions] : no skin lesions [No Focal Deficits] : no focal deficits [Alert and Oriented x3] : oriented to person, place, and time [Normal Affect] : the affect was normal [Normal Insight/Judgement] : insight and judgment were intact [Normal Mood] : the mood was normal [No Spinal Tenderness] : no spinal tenderness [de-identified] : no calf tenderness

## 2020-03-09 ENCOUNTER — APPOINTMENT (OUTPATIENT)
Dept: INTERNAL MEDICINE | Facility: CLINIC | Age: 65
End: 2020-03-09

## 2020-09-08 ENCOUNTER — APPOINTMENT (OUTPATIENT)
Dept: INTERNAL MEDICINE | Facility: CLINIC | Age: 65
End: 2020-09-08
Payer: COMMERCIAL

## 2020-09-08 ENCOUNTER — NON-APPOINTMENT (OUTPATIENT)
Age: 65
End: 2020-09-08

## 2020-09-08 VITALS
WEIGHT: 160 LBS | RESPIRATION RATE: 14 BRPM | DIASTOLIC BLOOD PRESSURE: 72 MMHG | OXYGEN SATURATION: 98 % | BODY MASS INDEX: 30.21 KG/M2 | TEMPERATURE: 98.4 F | SYSTOLIC BLOOD PRESSURE: 120 MMHG | HEART RATE: 72 BPM | HEIGHT: 61 IN

## 2020-09-08 DIAGNOSIS — Z23 ENCOUNTER FOR IMMUNIZATION: ICD-10-CM

## 2020-09-08 PROCEDURE — 36415 COLL VENOUS BLD VENIPUNCTURE: CPT

## 2020-09-08 PROCEDURE — 96127 BRIEF EMOTIONAL/BEHAV ASSMT: CPT

## 2020-09-08 PROCEDURE — 99396 PREV VISIT EST AGE 40-64: CPT | Mod: 25

## 2020-09-08 PROCEDURE — 90686 IIV4 VACC NO PRSV 0.5 ML IM: CPT

## 2020-09-08 PROCEDURE — 90472 IMMUNIZATION ADMIN EACH ADD: CPT

## 2020-09-08 PROCEDURE — G0009: CPT

## 2020-09-08 PROCEDURE — 93000 ELECTROCARDIOGRAM COMPLETE: CPT

## 2020-09-08 PROCEDURE — 90732 PPSV23 VACC 2 YRS+ SUBQ/IM: CPT

## 2020-09-08 NOTE — REVIEW OF SYSTEMS
[Back Pain] : back pain [Negative] : Genitourinary [Chills] : no chills [Fever] : no fever [Fatigue] : no fatigue [Recent Change In Weight] : ~T no recent weight change [Earache] : no earache [Nasal Discharge] : no nasal discharge [Palpitations] : no palpitations [Chest Pain] : no chest pain [Sore Throat] : no sore throat [Lower Ext Edema] : no lower extremity edema [Leg Claudication] : no leg claudication [Shortness Of Breath] : no shortness of breath [Wheezing] : no wheezing [Cough] : no cough [Dyspnea on Exertion] : no dyspnea on exertion [Abdominal Pain] : no abdominal pain [Nausea] : no nausea [Diarrhea] : diarrhea [Skin Rash] : no skin rash [Vomiting] : no vomiting [Depression] : no depression [Anxiety] : no anxiety

## 2020-09-08 NOTE — HEALTH RISK ASSESSMENT
[0] : 2) Feeling down, depressed, or hopeless: Not at all (0) [No] : In the past 12 months have you used drugs other than those required for medical reasons? No [Patient reported PAP Smear was normal] : Patient reported PAP Smear was normal [HIV test declined] : HIV test declined [With Significant Other] : lives with significant other [Unemployed] : unemployed [de-identified] : former smoker [] : No [[id number]] : 0 [PapSmearDate] : 01/20

## 2020-09-08 NOTE — PHYSICAL EXAM
[No Acute Distress] : no acute distress [Well Nourished] : well nourished [Well Developed] : well developed [Well-Appearing] : well-appearing [Normal Sclera/Conjunctiva] : normal sclera/conjunctiva [Normal Voice/Communication] : normal voice/communication [PERRL] : pupils equal round and reactive to light [Normal Oropharynx] : the oropharynx was normal [Normal] : no respiratory distress, lungs were clear to auscultation bilaterally and no accessory muscle use [No Edema] : there was no peripheral edema [No Abdominal Bruit] : a ~M bruit was not heard ~T in the abdomen [No Extremity Clubbing/Cyanosis] : no extremity clubbing/cyanosis [Soft] : abdomen soft [Non-distended] : non-distended [Non Tender] : non-tender [No Masses] : no abdominal mass palpated [No HSM] : no HSM [Normal Bowel Sounds] : normal bowel sounds [No Rash] : no rash [No Spinal Tenderness] : no spinal tenderness [No Skin Lesions] : no skin lesions [Normal Affect] : the affect was normal [No Focal Deficits] : no focal deficits [Alert and Oriented x3] : oriented to person, place, and time [Normal Mood] : the mood was normal [Normal Insight/Judgement] : insight and judgment were intact [EOMI] : extraocular movements intact [Normal TMs] : both tympanic membranes were normal [Normal Outer Ear/Nose] : the outer ears and nose were normal in appearance [No Carotid Bruits] : no carotid bruits [Normal Nasal Mucosa] : the nasal mucosa was normal [No CVA Tenderness] : no CVA  tenderness [de-identified] : no calf tenderness

## 2020-09-08 NOTE — ASSESSMENT
[FreeTextEntry1] : \par Hx of COPD\par -No sx\par -she is a former smoker\par \par Pulmonary nodules:\par -F/U chest CT showed small stable nodules and no further work up needed\par \par Chronic back pain:\par -previously referred to PT-I again advised but she refused\par -uses meloxicam 15 mg PO daily prn\par -x-ray of L spine and T spine revealed deg changes and OA\par \par Depression:\par -PHQ 2 score 0\par -she is asymptomatic\par \par Dyslipidemia:\par -on atorvastatin 10mg daily-takes sporadically\par -will check fasting lipids\par \par Osteopenia:\par -DEXA 2019-osteopenia\par -calcium + Vitamin D advised\par \par Obesity\par -BMI 30\par -risks of obesity discussed\par -benefits of weight loss discussed\par -low fat/low chol diet and low carbohydrate diet advised\par -small portion sizes encouraged\par -I advised avoidance of sugary drinks\par -aerobic exercise encouraged\par -weight loss advised\par \par Uterine fibroid:\par -f/u with GYN\par \par \par HCM:\par \par CPE 2020\par \par EKG 2020\par \par Depression screenin2020 PHQ 2 score 0\par \par Flu shot: advised.  R/B discussed.  No egg allergy reported.  VIS given.  Flu shot given today 2020\par \par Pneumovax: advised-R/B discussed.  Pneumovax given today 2020\par \par Tdap: advised- she declined previously-will discuss again at next visit\par \par Shingles vaccine: advised- she declined previously-will discuss again at next visit\par \par HIV testing offered: she declined 2020\par \par Hepatitis C screening: She has + hepatitis C screening test but viral load was negative x 2-likely reprsents false + test vs old cleared infection\par \par Mammogram: 2019 BR 1-will refer for mammogram today\par \par GYN: 2020-she will f/u with GYN\par \par DEXA: 2019-osteopenia\par \par Colonoscopy: 2017-Dr Vidal-she states benign colon polyps found\par \par F/U 6 months.  Fasting labs drawn today\par

## 2020-09-12 LAB
25(OH)D3 SERPL-MCNC: 31.4 NG/ML
ALBUMIN SERPL ELPH-MCNC: 4.7 G/DL
ALP BLD-CCNC: 65 U/L
ALT SERPL-CCNC: 40 U/L
ANION GAP SERPL CALC-SCNC: 13 MMOL/L
APPEARANCE: CLEAR
AST SERPL-CCNC: 28 U/L
BACTERIA: NEGATIVE
BASOPHILS # BLD AUTO: 0.04 K/UL
BASOPHILS NFR BLD AUTO: 0.6 %
BILIRUB SERPL-MCNC: 0.4 MG/DL
BILIRUBIN URINE: NEGATIVE
BLOOD URINE: NEGATIVE
BUN SERPL-MCNC: 19 MG/DL
CALCIUM SERPL-MCNC: 9.1 MG/DL
CHLORIDE SERPL-SCNC: 102 MMOL/L
CHOLEST SERPL-MCNC: 265 MG/DL
CHOLEST/HDLC SERPL: 5.9 RATIO
CO2 SERPL-SCNC: 26 MMOL/L
COLOR: YELLOW
CREAT SERPL-MCNC: 0.69 MG/DL
EOSINOPHIL # BLD AUTO: 0.21 K/UL
EOSINOPHIL NFR BLD AUTO: 3.4 %
ERYTHROCYTE [SEDIMENTATION RATE] IN BLOOD BY WESTERGREN METHOD: 21 MM/HR
ESTIMATED AVERAGE GLUCOSE: 117 MG/DL
GLUCOSE QUALITATIVE U: NEGATIVE
GLUCOSE SERPL-MCNC: 113 MG/DL
HBA1C MFR BLD HPLC: 5.7 %
HCT VFR BLD CALC: 44.5 %
HCV AB SER QL: NONREACTIVE
HCV S/CO RATIO: 0.81 S/CO
HDLC SERPL-MCNC: 45 MG/DL
HGB BLD-MCNC: 14 G/DL
HYALINE CASTS: 1 /LPF
IMM GRANULOCYTES NFR BLD AUTO: 0.6 %
KETONES URINE: NEGATIVE
LDLC SERPL CALC-MCNC: 159 MG/DL
LEUKOCYTE ESTERASE URINE: ABNORMAL
LYMPHOCYTES # BLD AUTO: 2.23 K/UL
LYMPHOCYTES NFR BLD AUTO: 35.8 %
MAN DIFF?: NORMAL
MCHC RBC-ENTMCNC: 29.6 PG
MCHC RBC-ENTMCNC: 31.5 GM/DL
MCV RBC AUTO: 94.1 FL
MICROSCOPIC-UA: NORMAL
MONOCYTES # BLD AUTO: 0.45 K/UL
MONOCYTES NFR BLD AUTO: 7.2 %
NEUTROPHILS # BLD AUTO: 3.26 K/UL
NEUTROPHILS NFR BLD AUTO: 52.4 %
NITRITE URINE: NEGATIVE
PH URINE: 6
PLATELET # BLD AUTO: 207 K/UL
POTASSIUM SERPL-SCNC: 4.5 MMOL/L
PROT SERPL-MCNC: 6.8 G/DL
PROTEIN URINE: NORMAL
RBC # BLD: 4.73 M/UL
RBC # FLD: 12.8 %
RED BLOOD CELLS URINE: 1 /HPF
SODIUM SERPL-SCNC: 142 MMOL/L
SPECIFIC GRAVITY URINE: 1.03
SQUAMOUS EPITHELIAL CELLS: 9 /HPF
T4 FREE SERPL-MCNC: 1.1 NG/DL
TRIGL SERPL-MCNC: 308 MG/DL
TSH SERPL-ACNC: 2.06 UIU/ML
UROBILINOGEN URINE: NORMAL
WBC # FLD AUTO: 6.23 K/UL
WHITE BLOOD CELLS URINE: 10 /HPF

## 2021-03-09 ENCOUNTER — APPOINTMENT (OUTPATIENT)
Dept: INTERNAL MEDICINE | Facility: CLINIC | Age: 66
End: 2021-03-09
Payer: COMMERCIAL

## 2021-03-09 ENCOUNTER — NON-APPOINTMENT (OUTPATIENT)
Age: 66
End: 2021-03-09

## 2021-03-09 VITALS
WEIGHT: 164 LBS | HEIGHT: 61 IN | BODY MASS INDEX: 30.96 KG/M2 | OXYGEN SATURATION: 98 % | SYSTOLIC BLOOD PRESSURE: 130 MMHG | TEMPERATURE: 98.1 F | RESPIRATION RATE: 14 BRPM | HEART RATE: 70 BPM | DIASTOLIC BLOOD PRESSURE: 76 MMHG

## 2021-03-09 DIAGNOSIS — U07.1 COVID-19: ICD-10-CM

## 2021-03-09 DIAGNOSIS — M54.5 LOW BACK PAIN: ICD-10-CM

## 2021-03-09 DIAGNOSIS — G89.29 LOW BACK PAIN: ICD-10-CM

## 2021-03-09 DIAGNOSIS — I70.90 UNSPECIFIED ATHEROSCLEROSIS: ICD-10-CM

## 2021-03-09 PROCEDURE — 99072 ADDL SUPL MATRL&STAF TM PHE: CPT

## 2021-03-09 PROCEDURE — 99214 OFFICE O/P EST MOD 30 MIN: CPT | Mod: 25

## 2021-03-09 PROCEDURE — 96127 BRIEF EMOTIONAL/BEHAV ASSMT: CPT

## 2021-03-09 NOTE — REVIEW OF SYSTEMS
[Fever] : no fever [Chills] : no chills [Fatigue] : no fatigue [Recent Change In Weight] : ~T no recent weight change [Chest Pain] : no chest pain [Palpitations] : no palpitations [Leg Claudication] : no leg claudication [Lower Ext Edema] : no lower extremity edema [Shortness Of Breath] : no shortness of breath [Wheezing] : no wheezing [Cough] : no cough [Dyspnea on Exertion] : no dyspnea on exertion [Abdominal Pain] : no abdominal pain [Nausea] : no nausea [Diarrhea] : diarrhea [Vomiting] : no vomiting [Skin Rash] : no skin rash [Anxiety] : no anxiety [Depression] : no depression [Negative] : Psychiatric

## 2021-03-09 NOTE — HISTORY OF PRESENT ILLNESS
[de-identified] : Here for follow up\par \par She states she had covid 19 2/7/2021.  She had CXR which showed patchy infiltrates.  She had CTA chest which showed findings of covid 19 and small penetrating atherosclerotic ulcer within transverse aorta\par \par She states sx have resolved\par \par She is here for fasting labs

## 2021-03-09 NOTE — PHYSICAL EXAM
[Well Nourished] : well nourished [Well Developed] : well developed [Well-Appearing] : well-appearing [Normal Voice/Communication] : normal voice/communication [Normal Sclera/Conjunctiva] : normal sclera/conjunctiva [PERRL] : pupils equal round and reactive to light [Normal Oropharynx] : the oropharynx was normal [Normal] : normal rate, regular rhythm, normal S1 and S2 and no murmur heard [No Abdominal Bruit] : a ~M bruit was not heard ~T in the abdomen [No Edema] : there was no peripheral edema [No Extremity Clubbing/Cyanosis] : no extremity clubbing/cyanosis [Soft] : abdomen soft [Non Tender] : non-tender [Non-distended] : non-distended [No Masses] : no abdominal mass palpated [No HSM] : no HSM [Normal Bowel Sounds] : normal bowel sounds [No Rash] : no rash [No Focal Deficits] : no focal deficits [Normal Affect] : the affect was normal [Alert and Oriented x3] : oriented to person, place, and time [Normal Mood] : the mood was normal [Normal Insight/Judgement] : insight and judgment were intact [No Acute Distress] : no acute distress [de-identified] : no calf tenderness

## 2021-03-09 NOTE — ASSESSMENT
[FreeTextEntry1] : \par Hx of COPD\par -No sx\par -she is a former smoker\par \par S/P Covid 19:\par -check labs\par \par Penetrating ulcer of aorta:\par -noted on CTA chest 2/2021\par -will refer to Vascular surgery\par -she denies chest pain or SOB\par -check lipids\par \par Chronic back pain:\par -no sx currently\par -uses meloxicam 15 mg PO daily prn-she reports very rare use\par -x-ray of L spine and T spine revealed deg changes and OA\par \par Dyslipidemia:\par -on atorvastatin 10mg daily\par -will check fasting lipids\par \par Osteopenia:\par -DEXA 9/2019-osteopenia\par -calcium + Vitamin D advised\par \par Obesity\par -BMI 30.99\par -risks of obesity discussed\par -benefits of weight loss discussed\par -low fat/low chol diet and low carbohydrate diet advised\par -small portion sizes encouraged\par -I advised avoidance of sugary drinks\par --weight loss advised\par \par Uterine fibroid:\par -f/u with GYN\par \par \par HCM:\par \par CPE 9/8/2020\par \par EKG 9/8/2020\par \par Depression screening: 3/9/2021 PHQ 2 score 0\par \par Flu shot: 9/8/2020\par \par Pneumovax:  9/8/2020\par \par Tdap: advised previously\par \par Shingles vaccine: advised previously\par \par Covid vaccine advised-eligibility letter written\par \par HIV testing offered: she declined 9/8/2020\par \par Hepatitis C screening: She has + hepatitis C screening test but viral load was negative x 2-likely reprsents false + test vs old cleared infection\par \par Mammogram: 9/2019 BR 1-will refer for mammogram again today 3/9/2021\par \par GYN: 1/2020-she will f/u with GYN\par \par DEXA: 9/2019-osteopenia\par \par Colonoscopy: 8/2017-Dr Vidal-she states benign colon polyps found\par \par F/U 3 months.  Fasting labs ordered\par

## 2021-03-13 LAB
ALBUMIN SERPL ELPH-MCNC: 4.5 G/DL
ALP BLD-CCNC: 75 U/L
ALT SERPL-CCNC: 28 U/L
ANION GAP SERPL CALC-SCNC: 9 MMOL/L
AST SERPL-CCNC: 22 U/L
BASOPHILS # BLD AUTO: 0.03 K/UL
BASOPHILS NFR BLD AUTO: 0.5 %
BILIRUB SERPL-MCNC: 0.4 MG/DL
BUN SERPL-MCNC: 18 MG/DL
CALCIUM SERPL-MCNC: 9 MG/DL
CHLORIDE SERPL-SCNC: 104 MMOL/L
CHOLEST SERPL-MCNC: 221 MG/DL
CO2 SERPL-SCNC: 30 MMOL/L
CREAT SERPL-MCNC: 0.59 MG/DL
EOSINOPHIL # BLD AUTO: 0.26 K/UL
EOSINOPHIL NFR BLD AUTO: 4.4 %
ERYTHROCYTE [SEDIMENTATION RATE] IN BLOOD BY WESTERGREN METHOD: 18 MM/HR
ESTIMATED AVERAGE GLUCOSE: 117 MG/DL
GLUCOSE SERPL-MCNC: 105 MG/DL
HBA1C MFR BLD HPLC: 5.7 %
HCT VFR BLD CALC: 39 %
HDLC SERPL-MCNC: 41 MG/DL
HGB BLD-MCNC: 12.6 G/DL
IMM GRANULOCYTES NFR BLD AUTO: 0.5 %
LDLC SERPL CALC-MCNC: 145 MG/DL
LYMPHOCYTES # BLD AUTO: 2.04 K/UL
LYMPHOCYTES NFR BLD AUTO: 34.9 %
MAN DIFF?: NORMAL
MCHC RBC-ENTMCNC: 29.8 PG
MCHC RBC-ENTMCNC: 32.3 GM/DL
MCV RBC AUTO: 92.2 FL
MONOCYTES # BLD AUTO: 0.47 K/UL
MONOCYTES NFR BLD AUTO: 8 %
NEUTROPHILS # BLD AUTO: 3.02 K/UL
NEUTROPHILS NFR BLD AUTO: 51.7 %
NONHDLC SERPL-MCNC: 180 MG/DL
PLATELET # BLD AUTO: 216 K/UL
POTASSIUM SERPL-SCNC: 4.4 MMOL/L
PROT SERPL-MCNC: 6.8 G/DL
RBC # BLD: 4.23 M/UL
RBC # FLD: 13.4 %
SODIUM SERPL-SCNC: 142 MMOL/L
TRIGL SERPL-MCNC: 178 MG/DL
WBC # FLD AUTO: 5.85 K/UL

## 2021-03-15 ENCOUNTER — NON-APPOINTMENT (OUTPATIENT)
Age: 66
End: 2021-03-15

## 2021-03-24 ENCOUNTER — APPOINTMENT (OUTPATIENT)
Dept: VASCULAR SURGERY | Facility: CLINIC | Age: 66
End: 2021-03-24
Payer: COMMERCIAL

## 2021-03-24 VITALS
HEIGHT: 62 IN | HEART RATE: 71 BPM | WEIGHT: 160 LBS | OXYGEN SATURATION: 99 % | SYSTOLIC BLOOD PRESSURE: 132 MMHG | DIASTOLIC BLOOD PRESSURE: 83 MMHG | BODY MASS INDEX: 29.44 KG/M2

## 2021-03-24 PROCEDURE — XXXXX: CPT

## 2021-03-24 PROCEDURE — 99072 ADDL SUPL MATRL&STAF TM PHE: CPT

## 2021-03-24 PROCEDURE — 93970 EXTREMITY STUDY: CPT

## 2021-06-08 ENCOUNTER — APPOINTMENT (OUTPATIENT)
Dept: INTERNAL MEDICINE | Facility: CLINIC | Age: 66
End: 2021-06-08

## 2021-06-23 ENCOUNTER — APPOINTMENT (OUTPATIENT)
Dept: VASCULAR SURGERY | Facility: CLINIC | Age: 66
End: 2021-06-23

## 2021-09-23 ENCOUNTER — APPOINTMENT (OUTPATIENT)
Dept: GASTROENTEROLOGY | Facility: CLINIC | Age: 66
End: 2021-09-23
Payer: MEDICARE

## 2021-09-23 VITALS
BODY MASS INDEX: 29.44 KG/M2 | TEMPERATURE: 98.1 F | HEART RATE: 72 BPM | SYSTOLIC BLOOD PRESSURE: 129 MMHG | HEIGHT: 62 IN | RESPIRATION RATE: 16 BRPM | WEIGHT: 160 LBS | DIASTOLIC BLOOD PRESSURE: 78 MMHG | OXYGEN SATURATION: 98 %

## 2021-09-23 DIAGNOSIS — R10.9 UNSPECIFIED ABDOMINAL PAIN: ICD-10-CM

## 2021-09-23 DIAGNOSIS — R11.10 VOMITING, UNSPECIFIED: ICD-10-CM

## 2021-09-23 DIAGNOSIS — R07.0 PAIN IN THROAT: ICD-10-CM

## 2021-09-23 DIAGNOSIS — R10.13 EPIGASTRIC PAIN: ICD-10-CM

## 2021-09-23 PROCEDURE — 99213 OFFICE O/P EST LOW 20 MIN: CPT

## 2021-09-23 NOTE — HISTORY OF PRESENT ILLNESS
[Heartburn] : stable heartburn [Nausea] : denies nausea [Vomiting] : denies vomiting [Diarrhea] : denies diarrhea [Constipation] : denies constipation [Yellow Skin Or Eyes (Jaundice)] : denies jaundice [Abdominal Pain] : stable abdominal pain [Abdominal Swelling] : abdominal swelling stable [Rectal Pain] : denies rectal pain [_________] : Performed [unfilled] [de-identified] : JULIA LARES is a 66 year old female presenting today with complaints of GERD and abdominal bloating. Last seen by Dr. Reveles in 2018 for the same complaints. She has seen 4-5 Gastroenterologists since 2016 and has had several colonoscopies and EGD's. She reports daily epigastric pain/burning, acid regurgitation (especially at night), and bloating. She is currently on a PPI that was prescribed by Dr. Perdomo in 2020 but she is unsure of the name. She states that it is 40 mg and she takes it once a day. Early in the visit she stated that she underwent an EGD with his office last year but then later on stated that he never did an EGD. Last EGD we have a record of was in 2016 with Dr. Villa that showed LA class C esophagitis and a small hiatal hernia. She admits to over eating throughout the day which causes her symptoms to flare up. She consumes coffee, spicy and acidic foods often. No nausea, vomiting, dysphagia, odynophagia, weight loss, or loss of appetite. \par \par No lower abdominal pain, bowel changes, black or bloody stools. Last colonoscopy was in December 2018 and was normal. No family history of colon cancer or polyps.\par \par Medical history includes COPD, HLD, and fatty liver. BMI 29.26. [de-identified] : as noted above [de-identified] : as noted above

## 2021-09-23 NOTE — ASSESSMENT
[FreeTextEntry1] : The patient presents today for evaluation of chronic GERD and bloating. No abdominal tenderness on physical exam. Last EGD may have been last year with Dr. Perdomo, we will request her records for review. I explained that it is difficult to manage her care because she keeps going to different physicians who are ordering the same tests and prescribing the same medications. I informed her that she needs to choose one gastroenterologist and solely use them for her care. She verbalized understanding. It is unclear if an EGD is indicated at this time as she may have just had one within the last year. \par \par She will stop whatever medication she is currently taking that was prescribed by Dr. Perdomo and she will start Pantoprazole 40 mg BID.\par \par Famotidine 40 mg BID PRN\par \par Follow up in 3 months with Dr. Reveles

## 2021-09-23 NOTE — PHYSICAL EXAM
[General Appearance - Alert] : alert [Sclera] : the sclera and conjunctiva were normal [General Appearance - In No Acute Distress] : in no acute distress [PERRL With Normal Accommodation] : pupils were equal in size, round, and reactive to light [Extraocular Movements] : extraocular movements were intact [Outer Ear] : the ears and nose were normal in appearance [Oropharynx] : the oropharynx was normal [Neck Appearance] : the appearance of the neck was normal [Neck Cervical Mass (___cm)] : no neck mass was observed [Jugular Venous Distention Increased] : there was no jugular-venous distention [Thyroid Diffuse Enlargement] : the thyroid was not enlarged [Thyroid Nodule] : there were no palpable thyroid nodules [Auscultation Breath Sounds / Voice Sounds] : lungs were clear to auscultation bilaterally [Heart Rate And Rhythm] : heart rate was normal and rhythm regular [Heart Sounds] : normal S1 and S2 [Heart Sounds Gallop] : no gallops [Murmurs] : no murmurs [Heart Sounds Pericardial Friction Rub] : no pericardial rub [Bowel Sounds] : normal bowel sounds [Abdomen Soft] : soft [Abdomen Tenderness] : non-tender [Abdomen Mass (___ Cm)] : no abdominal mass palpated [Abnormal Walk] : normal gait [Nail Clubbing] : no clubbing  or cyanosis of the fingernails [Musculoskeletal - Swelling] : no joint swelling seen [Motor Tone] : muscle strength and tone were normal [Skin Color & Pigmentation] : normal skin color and pigmentation [Skin Turgor] : normal skin turgor [] : no rash [Impaired Insight] : insight and judgment were intact [Oriented To Time, Place, And Person] : oriented to person, place, and time [Affect] : the affect was normal

## 2021-10-04 ENCOUNTER — APPOINTMENT (OUTPATIENT)
Dept: INTERNAL MEDICINE | Facility: CLINIC | Age: 66
End: 2021-10-04
Payer: MEDICARE

## 2021-10-04 ENCOUNTER — NON-APPOINTMENT (OUTPATIENT)
Age: 66
End: 2021-10-04

## 2021-10-04 VITALS
HEIGHT: 61 IN | BODY MASS INDEX: 32.1 KG/M2 | OXYGEN SATURATION: 99 % | DIASTOLIC BLOOD PRESSURE: 70 MMHG | HEART RATE: 68 BPM | SYSTOLIC BLOOD PRESSURE: 139 MMHG | WEIGHT: 170 LBS | TEMPERATURE: 98.3 F | RESPIRATION RATE: 16 BRPM

## 2021-10-04 DIAGNOSIS — Z23 ENCOUNTER FOR IMMUNIZATION: ICD-10-CM

## 2021-10-04 PROCEDURE — 93000 ELECTROCARDIOGRAM COMPLETE: CPT | Mod: 59

## 2021-10-04 PROCEDURE — G0402 INITIAL PREVENTIVE EXAM: CPT

## 2021-10-04 PROCEDURE — 36415 COLL VENOUS BLD VENIPUNCTURE: CPT

## 2021-10-04 PROCEDURE — 90471 IMMUNIZATION ADMIN: CPT

## 2021-10-04 PROCEDURE — 90715 TDAP VACCINE 7 YRS/> IM: CPT | Mod: GY

## 2021-10-04 NOTE — HEALTH RISK ASSESSMENT
[Yes] : Yes [No] : In the past 12 months have you used drugs other than those required for medical reasons? No [No falls in past year] : Patient reported no falls in the past year [0] : 2) Feeling down, depressed, or hopeless: Not at all (0) [PHQ-2 Negative - No further assessment needed] : PHQ-2 Negative - No further assessment needed [HIV test declined] : HIV test declined [With Significant Other] : lives with significant other [Retired] : retired [] :  [Fully functional (bathing, dressing, toileting, transferring, walking, feeding)] : Fully functional (bathing, dressing, toileting, transferring, walking, feeding) [Fully functional (using the telephone, shopping, preparing meals, housekeeping, doing laundry, using] : Fully functional and needs no help or supervision to perform IADLs (using the telephone, shopping, preparing meals, housekeeping, doing laundry, using transportation, managing medications and managing finances) [With Patient/Caregiver] : , with patient/caregiver [] : No [de-identified] : occasionally [[id number]] : 0 [Reports changes in hearing] : Reports no changes in hearing [Reports changes in vision] : Reports no changes in vision [AdvancecareDate] : 10/21 [FreeTextEntry4] : She does not have HCP, living will or advanced directives

## 2021-10-04 NOTE — ASSESSMENT
[FreeTextEntry1] : \par Obesity/Pre-DM:\par -HgA1c 5.7 3/2021\par -BMI 32\par -10 lb weight gain noted\par -risks of obesity discussed\par -benefits of weight loss discussed\par -low fat/low chol diet and low carbohydrate diet advised\par -small portion sizes encouraged\par -I advised avoidance of sugary drinks\par -aerobic exercise encouraged\par -weight loss advised\par \par Hx of COPD\par -No sx\par -she is a former smoker\par \par Penetrating ulcer of aorta:\par -noted on CTA chest 2/2021\par -she states she was seen by Vascular surgery-Dr Denise Coello and she was advised to f/u in 6 months\par -I have advised she f/u with Vascular surgery\par -she denies chest pain or SOB\par \par Dyslipidemia:\par -on atorvastatin 20mg daily\par -will check fasting lipids\par \par Osteopenia:\par -DEXA 9/2019-osteopenia\par -calcium + Vitamin D advised\par -check DEXA\par \par Uterine fibroid:\par -will refer to GYN for annual exam\par \par GERD\par -recently seen by GI and now on pepcid and protonix\par -sx seem to be well controlled\par \par BP today 139/70\par -I advised low fat/low cholesterol diet, low salt diet, and weight loss\par \par \par \par HCM:\par \par CPE 10/4/2021\par \par EKG 10/4/2021 NSR at 63, no ST abnormalities\par \par Depression screening: 10/4/2021 PHQ 2 score 0\par \par Flu shot: advised 10/4/2021-she declined\par \par Pneumovax:  9/8/2020\par \par Tdap: advised.  R/B discussed.  VIS give.  She consented to vaccination-Tdap given 10/4/2021\par \par Shingles vaccine: advised again today-she will check with insurance to see if covered\par \par Covid vaccine: Moderna\par \par HIV testing offered: she declined 10/4/2021\par \par Hepatitis C screening: She has + hepatitis C screening test but viral load was negative x 2-likely reprsents false + test vs old cleared infection\par \par Mammogram: 9/2019 BR 1-will refer for mammogram today 10/4/2021\par \par GYN: 1/2020-will refer to GYN for annual exam\par \par DEXA: [id number] refer for DEXA\par \par Colonoscopy: 8/2017-Dr Vidal-she states benign colon polyps found\par \par F/U 3 months.  Fasting labs ordered and drawn in office today\par

## 2021-10-04 NOTE — PHYSICAL EXAM
[No Acute Distress] : no acute distress [Well Nourished] : well nourished [Well Developed] : well developed [Well-Appearing] : well-appearing [Normal Voice/Communication] : normal voice/communication [Normal Sclera/Conjunctiva] : normal sclera/conjunctiva [PERRL] : pupils equal round and reactive to light [Normal Oropharynx] : the oropharynx was normal [Normal] : normal rate, regular rhythm, normal S1 and S2 and no murmur heard [No Abdominal Bruit] : a ~M bruit was not heard ~T in the abdomen [No Edema] : there was no peripheral edema [No Extremity Clubbing/Cyanosis] : no extremity clubbing/cyanosis [Soft] : abdomen soft [Non Tender] : non-tender [Non-distended] : non-distended [No Masses] : no abdominal mass palpated [No HSM] : no HSM [Normal Bowel Sounds] : normal bowel sounds [No Rash] : no rash [No Focal Deficits] : no focal deficits [Normal Affect] : the affect was normal [Alert and Oriented x3] : oriented to person, place, and time [Normal Mood] : the mood was normal [Normal Insight/Judgement] : insight and judgment were intact [EOMI] : extraocular movements intact [Normal Outer Ear/Nose] : the outer ears and nose were normal in appearance [Normal TMs] : both tympanic membranes were normal [Normal Nasal Mucosa] : the nasal mucosa was normal [No Carotid Bruits] : no carotid bruits [No Spinal Tenderness] : no spinal tenderness [No Joint Swelling] : no joint swelling [No Skin Lesions] : no skin lesions [de-identified] : no calf tenderness

## 2021-10-04 NOTE — REVIEW OF SYSTEMS
[Negative] : Musculoskeletal [Fever] : no fever [Chills] : no chills [Fatigue] : no fatigue [Chest Pain] : no chest pain [Palpitations] : no palpitations [Leg Claudication] : no leg claudication [Lower Ext Edema] : no lower extremity edema [Shortness Of Breath] : no shortness of breath [Wheezing] : no wheezing [Cough] : no cough [Dyspnea on Exertion] : no dyspnea on exertion [Abdominal Pain] : no abdominal pain [Nausea] : no nausea [Diarrhea] : diarrhea [Vomiting] : no vomiting [Skin Rash] : no skin rash [Anxiety] : no anxiety [Depression] : no depression [FreeTextEntry2] : 10 lb weight gain

## 2021-10-10 LAB
25(OH)D3 SERPL-MCNC: 31.3 NG/ML
ALBUMIN SERPL ELPH-MCNC: 4.8 G/DL
ALP BLD-CCNC: 81 U/L
ALT SERPL-CCNC: 32 U/L
ANION GAP SERPL CALC-SCNC: 13 MMOL/L
APPEARANCE: CLEAR
AST SERPL-CCNC: 25 U/L
BACTERIA: NEGATIVE
BASOPHILS # BLD AUTO: 0.05 K/UL
BASOPHILS NFR BLD AUTO: 0.7 %
BILIRUB SERPL-MCNC: 0.4 MG/DL
BILIRUBIN URINE: NEGATIVE
BLOOD URINE: NEGATIVE
BUN SERPL-MCNC: 17 MG/DL
CALCIUM SERPL-MCNC: 9.5 MG/DL
CHLORIDE SERPL-SCNC: 103 MMOL/L
CHOLEST SERPL-MCNC: 211 MG/DL
CO2 SERPL-SCNC: 26 MMOL/L
COLOR: YELLOW
CREAT SERPL-MCNC: 0.65 MG/DL
EOSINOPHIL # BLD AUTO: 0.21 K/UL
EOSINOPHIL NFR BLD AUTO: 2.9 %
ESTIMATED AVERAGE GLUCOSE: 123 MG/DL
GLUCOSE QUALITATIVE U: NEGATIVE
GLUCOSE SERPL-MCNC: 84 MG/DL
HBA1C MFR BLD HPLC: 5.9 %
HCT VFR BLD CALC: 40.5 %
HDLC SERPL-MCNC: 46 MG/DL
HGB BLD-MCNC: 13.2 G/DL
HYALINE CASTS: 1 /LPF
IMM GRANULOCYTES NFR BLD AUTO: 0.7 %
KETONES URINE: NEGATIVE
LDLC SERPL CALC-MCNC: 120 MG/DL
LEUKOCYTE ESTERASE URINE: ABNORMAL
LYMPHOCYTES # BLD AUTO: 2.69 K/UL
LYMPHOCYTES NFR BLD AUTO: 37.7 %
MAN DIFF?: NORMAL
MCHC RBC-ENTMCNC: 29.9 PG
MCHC RBC-ENTMCNC: 32.6 GM/DL
MCV RBC AUTO: 91.8 FL
MICROSCOPIC-UA: NORMAL
MONOCYTES # BLD AUTO: 0.5 K/UL
MONOCYTES NFR BLD AUTO: 7 %
NEUTROPHILS # BLD AUTO: 3.63 K/UL
NEUTROPHILS NFR BLD AUTO: 51 %
NITRITE URINE: NEGATIVE
NONHDLC SERPL-MCNC: 165 MG/DL
PH URINE: 5
PLATELET # BLD AUTO: 221 K/UL
POTASSIUM SERPL-SCNC: 4 MMOL/L
PROT SERPL-MCNC: 7 G/DL
PROTEIN URINE: NEGATIVE
RBC # BLD: 4.41 M/UL
RBC # FLD: 13 %
RED BLOOD CELLS URINE: 5 /HPF
SODIUM SERPL-SCNC: 142 MMOL/L
SPECIFIC GRAVITY URINE: 1.02
SQUAMOUS EPITHELIAL CELLS: 7 /HPF
T4 FREE SERPL-MCNC: 1 NG/DL
TRIGL SERPL-MCNC: 224 MG/DL
TSH SERPL-ACNC: 2.32 UIU/ML
UROBILINOGEN URINE: NORMAL
WBC # FLD AUTO: 7.13 K/UL
WHITE BLOOD CELLS URINE: 4 /HPF

## 2021-10-11 ENCOUNTER — APPOINTMENT (OUTPATIENT)
Dept: GASTROENTEROLOGY | Facility: CLINIC | Age: 66
End: 2021-10-11

## 2021-10-14 ENCOUNTER — NON-APPOINTMENT (OUTPATIENT)
Age: 66
End: 2021-10-14

## 2021-10-17 LAB
APPEARANCE: ABNORMAL
BACTERIA UR CULT: NORMAL
BACTERIA: NEGATIVE
BILIRUBIN URINE: NEGATIVE
BLOOD URINE: NORMAL
COLOR: YELLOW
GLUCOSE QUALITATIVE U: NEGATIVE
HYALINE CASTS: 4 /LPF
KETONES URINE: ABNORMAL
LEUKOCYTE ESTERASE URINE: ABNORMAL
MICROSCOPIC-UA: NORMAL
NITRITE URINE: NEGATIVE
PH URINE: 5.5
PROTEIN URINE: NORMAL
RED BLOOD CELLS URINE: 8 /HPF
SPECIFIC GRAVITY URINE: 1.03
SQUAMOUS EPITHELIAL CELLS: 15 /HPF
UROBILINOGEN URINE: NORMAL
WHITE BLOOD CELLS URINE: 10 /HPF

## 2021-10-19 ENCOUNTER — NON-APPOINTMENT (OUTPATIENT)
Age: 66
End: 2021-10-19

## 2021-11-23 ENCOUNTER — APPOINTMENT (OUTPATIENT)
Dept: UROLOGY | Facility: CLINIC | Age: 66
End: 2021-11-23
Payer: MEDICARE

## 2021-11-23 VITALS
HEART RATE: 86 BPM | DIASTOLIC BLOOD PRESSURE: 72 MMHG | HEIGHT: 61 IN | WEIGHT: 168 LBS | BODY MASS INDEX: 31.72 KG/M2 | SYSTOLIC BLOOD PRESSURE: 122 MMHG | OXYGEN SATURATION: 96 %

## 2021-11-23 PROCEDURE — 99203 OFFICE O/P NEW LOW 30 MIN: CPT

## 2021-11-23 NOTE — REVIEW OF SYSTEMS
[Shortness Of Breath] : shortness of breath [Loss of interest] : loss of interest in sexual activity [Joint Pain] : joint pain [Joint Swelling] : joint swelling [Feelings Of Weakness] : feelings of weakness [see HPI] : see HPI [Negative] : Endocrine

## 2021-11-24 LAB
ANION GAP SERPL CALC-SCNC: 19 MMOL/L
APPEARANCE: CLEAR
BACTERIA: NEGATIVE
BILIRUBIN URINE: NEGATIVE
BLOOD URINE: ABNORMAL
BUN SERPL-MCNC: 20 MG/DL
CALCIUM SERPL-MCNC: 8.9 MG/DL
CHLORIDE SERPL-SCNC: 97 MMOL/L
CO2 SERPL-SCNC: 23 MMOL/L
COLOR: YELLOW
CREAT SERPL-MCNC: 0.69 MG/DL
GLUCOSE QUALITATIVE U: NEGATIVE
GLUCOSE SERPL-MCNC: 51 MG/DL
HYALINE CASTS: 1 /LPF
KETONES URINE: NEGATIVE
LEUKOCYTE ESTERASE URINE: ABNORMAL
MICROSCOPIC-UA: NORMAL
NITRITE URINE: NEGATIVE
PH URINE: 7
POTASSIUM SERPL-SCNC: 5.3 MMOL/L
PROTEIN URINE: NORMAL
RED BLOOD CELLS URINE: 4 /HPF
SODIUM SERPL-SCNC: 139 MMOL/L
SPECIFIC GRAVITY URINE: 1.04
SQUAMOUS EPITHELIAL CELLS: 9 /HPF
UROBILINOGEN URINE: NORMAL
WHITE BLOOD CELLS URINE: 7 /HPF

## 2021-11-27 LAB — URINE CYTOLOGY: NORMAL

## 2021-11-30 ENCOUNTER — NON-APPOINTMENT (OUTPATIENT)
Age: 66
End: 2021-11-30

## 2021-12-08 ENCOUNTER — LABORATORY RESULT (OUTPATIENT)
Age: 66
End: 2021-12-08

## 2021-12-11 ENCOUNTER — APPOINTMENT (OUTPATIENT)
Dept: CT IMAGING | Facility: CLINIC | Age: 66
End: 2021-12-11

## 2021-12-22 ENCOUNTER — APPOINTMENT (OUTPATIENT)
Dept: RADIOLOGY | Facility: CLINIC | Age: 66
End: 2021-12-22

## 2021-12-22 ENCOUNTER — APPOINTMENT (OUTPATIENT)
Dept: MAMMOGRAPHY | Facility: CLINIC | Age: 66
End: 2021-12-22

## 2021-12-29 ENCOUNTER — OUTPATIENT (OUTPATIENT)
Dept: OUTPATIENT SERVICES | Facility: HOSPITAL | Age: 66
LOS: 1 days | End: 2021-12-29
Payer: MEDICARE

## 2021-12-29 ENCOUNTER — APPOINTMENT (OUTPATIENT)
Dept: CT IMAGING | Facility: CLINIC | Age: 66
End: 2021-12-29
Payer: MEDICARE

## 2021-12-29 DIAGNOSIS — R31.29 OTHER MICROSCOPIC HEMATURIA: ICD-10-CM

## 2021-12-29 PROCEDURE — 74178 CT ABD&PLV WO CNTR FLWD CNTR: CPT

## 2021-12-29 PROCEDURE — 82565 ASSAY OF CREATININE: CPT

## 2021-12-29 PROCEDURE — 74178 CT ABD&PLV WO CNTR FLWD CNTR: CPT | Mod: 26,MH

## 2022-01-03 ENCOUNTER — APPOINTMENT (OUTPATIENT)
Dept: MAMMOGRAPHY | Facility: CLINIC | Age: 67
End: 2022-01-03
Payer: MEDICARE

## 2022-01-03 ENCOUNTER — RESULT REVIEW (OUTPATIENT)
Age: 67
End: 2022-01-03

## 2022-01-03 ENCOUNTER — APPOINTMENT (OUTPATIENT)
Dept: RADIOLOGY | Facility: CLINIC | Age: 67
End: 2022-01-03
Payer: MEDICARE

## 2022-01-03 ENCOUNTER — OUTPATIENT (OUTPATIENT)
Dept: OUTPATIENT SERVICES | Facility: HOSPITAL | Age: 67
LOS: 1 days | End: 2022-01-03
Payer: MEDICARE

## 2022-01-03 DIAGNOSIS — Z00.00 ENCOUNTER FOR GENERAL ADULT MEDICAL EXAMINATION WITHOUT ABNORMAL FINDINGS: ICD-10-CM

## 2022-01-03 PROCEDURE — 77067 SCR MAMMO BI INCL CAD: CPT

## 2022-01-03 PROCEDURE — 77080 DXA BONE DENSITY AXIAL: CPT | Mod: 26

## 2022-01-03 PROCEDURE — 77063 BREAST TOMOSYNTHESIS BI: CPT

## 2022-01-03 PROCEDURE — 77063 BREAST TOMOSYNTHESIS BI: CPT | Mod: 26

## 2022-01-03 PROCEDURE — 77067 SCR MAMMO BI INCL CAD: CPT | Mod: 26

## 2022-01-03 PROCEDURE — 77080 DXA BONE DENSITY AXIAL: CPT

## 2022-01-04 ENCOUNTER — NON-APPOINTMENT (OUTPATIENT)
Age: 67
End: 2022-01-04

## 2022-01-07 ENCOUNTER — NON-APPOINTMENT (OUTPATIENT)
Age: 67
End: 2022-01-07

## 2022-02-02 ENCOUNTER — APPOINTMENT (OUTPATIENT)
Dept: UROLOGY | Facility: CLINIC | Age: 67
End: 2022-02-02
Payer: MEDICARE

## 2022-02-02 VITALS
HEART RATE: 69 BPM | SYSTOLIC BLOOD PRESSURE: 120 MMHG | BODY MASS INDEX: 32.1 KG/M2 | OXYGEN SATURATION: 98 % | HEIGHT: 61 IN | DIASTOLIC BLOOD PRESSURE: 65 MMHG | WEIGHT: 170 LBS

## 2022-02-02 PROCEDURE — 52000 CYSTOURETHROSCOPY: CPT

## 2022-06-01 ENCOUNTER — APPOINTMENT (OUTPATIENT)
Dept: INTERNAL MEDICINE | Facility: CLINIC | Age: 67
End: 2022-06-01
Payer: COMMERCIAL

## 2022-06-01 VITALS
OXYGEN SATURATION: 98 % | BODY MASS INDEX: 30.4 KG/M2 | TEMPERATURE: 98.2 F | RESPIRATION RATE: 15 BRPM | SYSTOLIC BLOOD PRESSURE: 150 MMHG | DIASTOLIC BLOOD PRESSURE: 81 MMHG | HEIGHT: 61 IN | HEART RATE: 78 BPM | WEIGHT: 161 LBS

## 2022-06-01 DIAGNOSIS — R31.29 OTHER MICROSCOPIC HEMATURIA: ICD-10-CM

## 2022-06-01 DIAGNOSIS — M85.80 OTHER SPECIFIED DISORDERS OF BONE DENSITY AND STRUCTURE, UNSPECIFIED SITE: ICD-10-CM

## 2022-06-01 DIAGNOSIS — B02.9 ZOSTER W/OUT COMPLICATIONS: ICD-10-CM

## 2022-06-01 DIAGNOSIS — Z13.31 ENCOUNTER FOR SCREENING FOR DEPRESSION: ICD-10-CM

## 2022-06-01 PROCEDURE — G0444 DEPRESSION SCREEN ANNUAL: CPT | Mod: 59

## 2022-06-01 PROCEDURE — 99214 OFFICE O/P EST MOD 30 MIN: CPT | Mod: 25

## 2022-06-02 ENCOUNTER — APPOINTMENT (OUTPATIENT)
Dept: INTERNAL MEDICINE | Facility: CLINIC | Age: 67
End: 2022-06-02

## 2022-06-05 PROBLEM — R31.29 MICROSCOPIC HEMATURIA: Status: ACTIVE | Noted: 2021-10-10

## 2022-06-05 PROBLEM — M85.80 OSTEOPENIA: Status: ACTIVE | Noted: 2018-02-13

## 2022-06-05 PROBLEM — Z13.31 DEPRESSION SCREENING: Status: ACTIVE | Noted: 2020-09-08

## 2022-06-05 NOTE — HISTORY OF PRESENT ILLNESS
[FreeTextEntry8] : Here today for evaluation of shingles which was recently diagnosed.  She states is it affected her right side of face and ear.\par \par She reports she went to [facility] and had facial plastic surgery (facelift) there approximately 3 weeks ago.  She states about 3 days ago while she was still in [facility] she started to develop right facial pain/right ear pain, and facial skin lesions.  She appears to have gone to an ER or urgent care there and was told she had cerumen impaction and was referred to ENT.  She states she saw an ENT doctor who told her she did not have cerumen impaction but in fact had shingles.  She was started on Augmentin 875 mg twice a day and acyclovir 800 mg 4 times a day.  She has been taking these medications.  She states she overall feels that pain has lessened.  She denies any eye involvement.  She denies any eye pain, vision loss.

## 2022-06-05 NOTE — HEALTH RISK ASSESSMENT
[0] : 2) Feeling down, depressed, or hopeless: Not at all (0) [PHQ-2 Negative - No further assessment needed] : PHQ-2 Negative - No further assessment needed [[id number]] : 0

## 2022-06-05 NOTE — REVIEW OF SYSTEMS
[Negative] : Psychiatric [Earache] : earache [Fever] : no fever [Chills] : no chills [Fatigue] : no fatigue [Nasal Discharge] : no nasal discharge [Sore Throat] : no sore throat [Chest Pain] : no chest pain [Palpitations] : no palpitations [Leg Claudication] : no leg claudication [Lower Ext Edema] : no lower extremity edema [Shortness Of Breath] : no shortness of breath [Wheezing] : no wheezing [Cough] : no cough [Dyspnea on Exertion] : no dyspnea on exertion [Abdominal Pain] : no abdominal pain [Nausea] : no nausea [Diarrhea] : diarrhea [Vomiting] : no vomiting [de-identified] : See HPI

## 2022-06-05 NOTE — PHYSICAL EXAM
[No Acute Distress] : no acute distress [Normal Voice/Communication] : normal voice/communication [Normal Sclera/Conjunctiva] : normal sclera/conjunctiva [PERRL] : pupils equal round and reactive to light [EOMI] : extraocular movements intact [Normal Outer Ear/Nose] : the outer ears and nose were normal in appearance [Normal Oropharynx] : the oropharynx was normal [Normal Nasal Mucosa] : the nasal mucosa was normal [Normal] : normal rate, regular rhythm, normal S1 and S2 and no murmur heard [No Abdominal Bruit] : a ~M bruit was not heard ~T in the abdomen [No Edema] : there was no peripheral edema [No Extremity Clubbing/Cyanosis] : no extremity clubbing/cyanosis [Soft] : abdomen soft [Non Tender] : non-tender [Non-distended] : non-distended [No Masses] : no abdominal mass palpated [No HSM] : no HSM [Normal Bowel Sounds] : normal bowel sounds [No Focal Deficits] : no focal deficits [Normal Affect] : the affect was normal [Alert and Oriented x3] : oriented to person, place, and time [Normal Mood] : the mood was normal [Normal Insight/Judgement] : insight and judgment were intact [No JVD] : no jugular venous distention [No Lymphadenopathy] : no lymphadenopathy [Supple] : supple [Thyroid Normal, No Nodules] : the thyroid was normal and there were no nodules present [de-identified] : no calf tenderness  [de-identified] : Left TM intact, right TM with normal canal.  She appears to have a yellowish/orange crusting lesion on right TM [de-identified] : She has faint papular skin lesions on right side of face/cheek which appear to be crusting over.  No current vesicles or ulcerations seen

## 2022-06-05 NOTE — ASSESSMENT
[FreeTextEntry1] : \par Shingles/[place] syndrome\par -Symptoms started approximately 3 days ago\par -She was seen by ENT in [facility]\par -She is currently on Augmentin 875 mg twice a day which I advise she continue for now to cover bacterial superinfection given recent surgery\par -She is currently on acyclovir 800 mg 4 times a day\par -I discussed changing to Valtrex but she states she has no medication insurance coverage\par -I advise she take acyclovir 800 mg 5 times a day-she was prescribed another for 5 days in [facility].  I will extend it to total set of 7-day course\par -Overall she reports that the pain seems to be improving significantly\par -I did advise that she can purchase OTC lidocaine cream to use topically if needed.  Advised that she can also use OTC Tylenol for pain\par -Check labs\par -She is to notify office if symptoms persist or worsen\par -Given ear lesion I did advise that she follow-up with ENT here in the [facility] and referral given\par -Follow-up in 2 weeks\par \par Obesity/Pre-DM:\par -check fasting labs including hemoglobin A1c\par -BMI 30\par \par Hx of COPD\par -No sx\par -she is a former smoker\par \par Penetrating ulcer of aorta:\par -noted on CTA chest 2021\par -she states she was seen by Vascular surgery-Dr Denise Coello and she was advised to f/u in 6 months\par -she denies chest pain or SOB\par -She never did follow-up with vascular surgery.  I discussed that she likely needs follow-up imaging possibly including CTA chest.  I have advised that she follow-up with vascular surgery and referral given\par \par Dyslipidemia:\par -on atorvastatin 40mg daily\par -will check fasting lipids\par \par Osteopenia:\par -DEXA 2022-osteopenia\par -calcium + Vitamin D advised\par \par Uterine fibroid:\par -She was previously referred to GYN for annual exam and I again advised\par \par GERD\par -recently seen by GI- on pepcid and protonix\par -sx seem to be well controlled\par \par BP mildly elevated today but she is having some facial pain from her shingles\par -I advised low fat/low cholesterol diet, low salt diet, and weight loss\par -Follow-up in 2 weeks for BP check\par \par Microscopic hematuria\par -She was seen by urologist Dr. John\par -She had negative urine cytology\par -She had negative CT urogram 2022\par -She had negative cystoscopy 2022\par -Urology advised follow-up in 1 year 2023\par \par \par HCM:\par \par CPE 10/4/2021\par \par EKG 10/4/2021 \par \par Depression screenin2022 PHQ 2 score 0\par \par Flu shot: advised 10/4/2021-she declined\par \par Pneumovax:  2020\par \par Tdap: advised. 10/4/2021\par \par Shingles vaccine: advised previously\par \par Covid vaccine: COVID-vaccine x3-she states she thinks it was Pfizer\par \par HIV testing offered: she declined 10/4/2021\par \par Hepatitis C screening: She has + hepatitis C screening test but viral load was negative x 2-likely reprsents false + test vs old cleared infection\par \par Mammogram: 2022 BR 2\par \par GYN: 2020-she was previously advised to follow-up with GYN for annual exam\par \par DEXA: 2022-osteopenia\par \par Colonoscopy: 2017-Dr Vidal-she states benign colon polyps found\par \par F/U 2 weeks.  Fasting labs ordered which she will have done at the lab\par

## 2022-06-15 ENCOUNTER — APPOINTMENT (OUTPATIENT)
Dept: INTERNAL MEDICINE | Facility: CLINIC | Age: 67
End: 2022-06-15

## 2022-11-11 ENCOUNTER — APPOINTMENT (OUTPATIENT)
Dept: INTERNAL MEDICINE | Facility: CLINIC | Age: 67
End: 2022-11-11

## 2022-11-11 ENCOUNTER — NON-APPOINTMENT (OUTPATIENT)
Age: 67
End: 2022-11-11

## 2022-11-11 VITALS
DIASTOLIC BLOOD PRESSURE: 80 MMHG | OXYGEN SATURATION: 97 % | WEIGHT: 160 LBS | BODY MASS INDEX: 30.21 KG/M2 | RESPIRATION RATE: 15 BRPM | TEMPERATURE: 97.8 F | HEIGHT: 61 IN | SYSTOLIC BLOOD PRESSURE: 130 MMHG | HEART RATE: 75 BPM

## 2022-11-11 PROCEDURE — 99213 OFFICE O/P EST LOW 20 MIN: CPT

## 2022-11-11 NOTE — HISTORY OF PRESENT ILLNESS
[No Pertinent Cardiac History] : no history of aortic stenosis, atrial fibrillation, coronary artery disease, recent myocardial infarction, or implantable device/pacemaker [No Pertinent Pulmonary History] : no history of asthma, COPD, sleep apnea, or smoking [No Adverse Anesthesia Reaction] : no adverse anesthesia reaction in self or family member [(Patient denies any chest pain, claudication, dyspnea on exertion, orthopnea, palpitations or syncope)] : Patient denies any chest pain, claudication, dyspnea on exertion, orthopnea, palpitations or syncope [Poor (<4 METs)] : Poor (<4 METs) [Chronic Anticoagulation] : no chronic anticoagulation [Chronic Kidney Disease] : no chronic kidney disease [Diabetes] : no diabetes [FreeTextEntry1] : right foot bunion surgery [FreeTextEntry2] : 11/18/22 [FreeTextEntry3] : Dr Jeremias SLATER [FreeTextEntry4] : Ms. JULIA LARES  is    67 year female . \par hx of HLD on atorvastatin 40 mg , osteopenia on ca+vitd supplement, GERD on pepcid and protonix.\par Pt. is over all feeling well.\par \par

## 2023-02-28 ENCOUNTER — OUTPATIENT (OUTPATIENT)
Dept: OUTPATIENT SERVICES | Facility: HOSPITAL | Age: 68
LOS: 1 days | End: 2023-02-28
Payer: MEDICARE

## 2023-02-28 ENCOUNTER — APPOINTMENT (OUTPATIENT)
Dept: RADIOLOGY | Facility: CLINIC | Age: 68
End: 2023-02-28
Payer: MEDICARE

## 2023-02-28 DIAGNOSIS — Z00.8 ENCOUNTER FOR OTHER GENERAL EXAMINATION: ICD-10-CM

## 2023-02-28 PROCEDURE — 73030 X-RAY EXAM OF SHOULDER: CPT | Mod: 26,RT

## 2023-02-28 PROCEDURE — 73030 X-RAY EXAM OF SHOULDER: CPT

## 2023-03-25 ENCOUNTER — APPOINTMENT (OUTPATIENT)
Dept: MRI IMAGING | Facility: CLINIC | Age: 68
End: 2023-03-25
Payer: MEDICARE

## 2023-03-25 PROCEDURE — 72148 MRI LUMBAR SPINE W/O DYE: CPT | Mod: MH

## 2023-05-23 ENCOUNTER — NON-APPOINTMENT (OUTPATIENT)
Age: 68
End: 2023-05-23

## 2023-05-23 ENCOUNTER — APPOINTMENT (OUTPATIENT)
Dept: INTERNAL MEDICINE | Facility: CLINIC | Age: 68
End: 2023-05-23
Payer: MEDICARE

## 2023-05-23 VITALS
TEMPERATURE: 97.9 F | SYSTOLIC BLOOD PRESSURE: 130 MMHG | HEIGHT: 61 IN | DIASTOLIC BLOOD PRESSURE: 80 MMHG | WEIGHT: 165 LBS | OXYGEN SATURATION: 95 % | BODY MASS INDEX: 31.15 KG/M2 | HEART RATE: 80 BPM | RESPIRATION RATE: 15 BRPM

## 2023-05-23 DIAGNOSIS — Z00.00 ENCOUNTER FOR GENERAL ADULT MEDICAL EXAMINATION W/OUT ABNORMAL FINDINGS: ICD-10-CM

## 2023-05-23 DIAGNOSIS — E55.9 VITAMIN D DEFICIENCY, UNSPECIFIED: ICD-10-CM

## 2023-05-23 DIAGNOSIS — R73.03 PREDIABETES.: ICD-10-CM

## 2023-05-23 PROCEDURE — G0439: CPT

## 2023-05-23 PROCEDURE — 93000 ELECTROCARDIOGRAM COMPLETE: CPT

## 2023-05-23 PROCEDURE — 36415 COLL VENOUS BLD VENIPUNCTURE: CPT

## 2023-05-23 RX ORDER — ACYCLOVIR 800 MG/1
800 TABLET ORAL
Qty: 15 | Refills: 0 | Status: DISCONTINUED | COMMUNITY
Start: 2022-06-01 | End: 2023-05-23

## 2023-05-23 RX ORDER — AMOXICILLIN AND CLAVULANATE POTASSIUM 875; 125 MG/1; MG/1
875-125 TABLET, COATED ORAL
Qty: 6 | Refills: 0 | Status: DISCONTINUED | COMMUNITY
Start: 2022-06-01 | End: 2023-05-23

## 2023-05-23 RX ORDER — PANTOPRAZOLE 40 MG/1
40 TABLET, DELAYED RELEASE ORAL TWICE DAILY
Qty: 180 | Refills: 0 | Status: DISCONTINUED | COMMUNITY
Start: 2021-09-23 | End: 2023-05-23

## 2023-05-23 NOTE — ASSESSMENT
[FreeTextEntry1] : \par \par \par Obesity/Pre-DM:\par -check fasting labs including hemoglobin A1c\par -BMI 31\par -I have advised low-fat, low-cholesterol, low carbohydrate diet.\par -She should avoid sugary drinks and sugary foods\par -We did discuss treatment options for obesity including medications such as Wegovy\par -I have advised she check labs first\par -She should follow-up in 3 months for weight check\par \par Hx of COPD\par -No sx\par -she is a former smoker\par \par Penetrating ulcer of aorta:\par -noted on CTA chest 2021\par -she states she was seen by Vascular surgery-Dr Denise Coello and she was advised to f/u in 6 months but she never followed up\par -I previously referred her to vascular surgery but she never went\par -I have again advised her that it was very important that she follow-up with vascular surgery referral given\par -I will order CTA of aorta with and without contrast\par -Importance of follow-up discussed\par -She denies any chest pain or shortness of breath\par \par Dyslipidemia:\par -She states she is no longer taking her atorvastatin 40 mg daily\par -will check fasting lipids\par \par Osteopenia:\par -DEXA 2022-osteopenia\par -calcium + Vitamin D\par \par GERD\par -She is on famotidine as needed\par -sx seem to be well controlled\par \par Microscopic hematuria\par -She was seen by urologist Dr. John\par -She had negative urine cytology\par -She had negative CT urogram 2022\par -She had negative cystoscopy 2022\par -Urology advised follow-up in 1 year \par -Check UA\par \par History of colon polyps\par -She is due for colonoscopy and referred to GI today\par \par \par HCM:\par \par CPE 2023\par \par EKG 2023 NSR at 77 with no ST abnormalities\par \par Depression screenin2023 PHQ 2 score 0\par \par Flu shot: She did not get flu shot this past flu season\par \par Pneumovax:  2020\par \par Prevnar 20 advised 2023 and she declined today\par \par Tdap: 10/2021\par \par Shingles vaccine: advised 2023.  She should check with insurance to see if covered\par \par Covid vaccine: Pfizer COVID-vaccine x3-advised COVID-19 bivalent vaccine\par \par HIV testing offered: she declined 2023\par \par Hepatitis C screening: She has + hepatitis C screening test but viral load was negative x 2-likely represents false + test vs old cleared infection\par \par Mammogram: 2022 BR 2-she is due for annual mammogram which was ordered today 2023\par \par GYN: 2020-she was previously advised to follow-up with GYN for annual exam-she states she spoke with her GYN and was told that she did not need to follow-up for routine exams due to her age\par \par DEXA: 2022-osteopenia\par \par Colonoscopy: 2017-Dr Vidal-she states benign colon polyps found-she is due for colonoscopy and will refer to GI today \par \par F/U 3 months.  Fasting labs ordered and drawn in office today\par

## 2023-05-23 NOTE — REVIEW OF SYSTEMS
[Negative] : Integumentary [Fever] : no fever [Chills] : no chills [Fatigue] : no fatigue [Sore Throat] : no sore throat [Chest Pain] : no chest pain [Palpitations] : no palpitations [Leg Claudication] : no leg claudication [Lower Ext Edema] : no lower extremity edema [Shortness Of Breath] : no shortness of breath [Wheezing] : no wheezing [Cough] : no cough [Dyspnea on Exertion] : no dyspnea on exertion [Abdominal Pain] : no abdominal pain [Nausea] : no nausea [Diarrhea] : diarrhea [Vomiting] : no vomiting

## 2023-05-23 NOTE — PHYSICAL EXAM
[No Acute Distress] : no acute distress [Normal Voice/Communication] : normal voice/communication [Normal Sclera/Conjunctiva] : normal sclera/conjunctiva [PERRL] : pupils equal round and reactive to light [EOMI] : extraocular movements intact [Normal Outer Ear/Nose] : the outer ears and nose were normal in appearance [Normal Oropharynx] : the oropharynx was normal [Normal Nasal Mucosa] : the nasal mucosa was normal [No JVD] : no jugular venous distention [No Lymphadenopathy] : no lymphadenopathy [Supple] : supple [Thyroid Normal, No Nodules] : the thyroid was normal and there were no nodules present [Normal] : normal rate, regular rhythm, normal S1 and S2 and no murmur heard [No Abdominal Bruit] : a ~M bruit was not heard ~T in the abdomen [No Edema] : there was no peripheral edema [No Extremity Clubbing/Cyanosis] : no extremity clubbing/cyanosis [Soft] : abdomen soft [Non Tender] : non-tender [Non-distended] : non-distended [No Masses] : no abdominal mass palpated [No HSM] : no HSM [Normal Bowel Sounds] : normal bowel sounds [No Focal Deficits] : no focal deficits [Normal Affect] : the affect was normal [Alert and Oriented x3] : oriented to person, place, and time [Normal Mood] : the mood was normal [Normal Insight/Judgement] : insight and judgment were intact [Well Nourished] : well nourished [Well Developed] : well developed [Well-Appearing] : well-appearing [Normal TMs] : both tympanic membranes were normal [No Carotid Bruits] : no carotid bruits [No Spinal Tenderness] : no spinal tenderness [No Joint Swelling] : no joint swelling [No Rash] : no rash [de-identified] : no calf tenderness

## 2023-05-23 NOTE — HEALTH RISK ASSESSMENT
[No] : In the past 12 months have you used drugs other than those required for medical reasons? No [No falls in past year] : Patient reported no falls in the past year [0] : 2) Feeling down, depressed, or hopeless: Not at all (0) [PHQ-2 Negative - No further assessment needed] : PHQ-2 Negative - No further assessment needed [HIV test declined] : HIV test declined [With Significant Other] : lives with significant other [Retired] : retired [] :  [Fully functional (bathing, dressing, toileting, transferring, walking, feeding)] : Fully functional (bathing, dressing, toileting, transferring, walking, feeding) [Fully functional (using the telephone, shopping, preparing meals, housekeeping, doing laundry, using] : Fully functional and needs no help or supervision to perform IADLs (using the telephone, shopping, preparing meals, housekeeping, doing laundry, using transportation, managing medications and managing finances) [With Patient/Caregiver] : , with patient/caregiver [Former] : Former [[id number]] : 0 [AdvancecareDate] : 05/23 [FreeTextEntry4] : She does not have HCP, living will , and advanced directives

## 2023-05-23 NOTE — HISTORY OF PRESENT ILLNESS
[de-identified] : Here today for her annual wellness visit\par \par She was last seen by me 6/1/2022.  At that visit I ordered blood work but she did not complete.\par \par At last visit I also advised her to see vascular surgery for follow-up of penetrating ulcer of aorta but she did not go.  She denies chest pain, shortness of breath, palpitations.\par \par She states she has been having a hard time losing weight and wonders if new injectable medications can be prescribed to her.  She states her diet is not great.  She states she is just looking for a medication that will help her lose weight.  She does not exercise much.

## 2023-05-29 LAB
25(OH)D3 SERPL-MCNC: 29.6 NG/ML
ALBUMIN SERPL ELPH-MCNC: 4.7 G/DL
ALP BLD-CCNC: 81 U/L
ALT SERPL-CCNC: 36 U/L
ANION GAP SERPL CALC-SCNC: 11 MMOL/L
APPEARANCE: CLEAR
AST SERPL-CCNC: 26 U/L
BACTERIA: NEGATIVE /HPF
BILIRUB SERPL-MCNC: 0.5 MG/DL
BILIRUBIN URINE: NEGATIVE
BLOOD URINE: NEGATIVE
BUN SERPL-MCNC: 21 MG/DL
CALCIUM SERPL-MCNC: 9.8 MG/DL
CAST: 0 /LPF
CHLORIDE SERPL-SCNC: 100 MMOL/L
CHOLEST SERPL-MCNC: 306 MG/DL
CO2 SERPL-SCNC: 31 MMOL/L
COLOR: YELLOW
CREAT SERPL-MCNC: 0.67 MG/DL
EGFR: 96 ML/MIN/1.73M2
EPITHELIAL CELLS: 8 /HPF
ERYTHROCYTE [SEDIMENTATION RATE] IN BLOOD BY WESTERGREN METHOD: 14 MM/HR
ESTIMATED AVERAGE GLUCOSE: 114 MG/DL
GLUCOSE QUALITATIVE U: NEGATIVE MG/DL
GLUCOSE SERPL-MCNC: 105 MG/DL
HBA1C MFR BLD HPLC: 5.6 %
HDLC SERPL-MCNC: 47 MG/DL
KETONES URINE: NEGATIVE MG/DL
LDLC SERPL CALC-MCNC: 215 MG/DL
LEUKOCYTE ESTERASE URINE: NEGATIVE
MICROSCOPIC-UA: NORMAL
NITRITE URINE: NEGATIVE
NONHDLC SERPL-MCNC: 259 MG/DL
PH URINE: 6.5
POTASSIUM SERPL-SCNC: 5.1 MMOL/L
PROT SERPL-MCNC: 7 G/DL
PROTEIN URINE: NEGATIVE MG/DL
RED BLOOD CELLS URINE: 1 /HPF
SODIUM SERPL-SCNC: 141 MMOL/L
SPECIFIC GRAVITY URINE: 1.02
T4 FREE SERPL-MCNC: 1 NG/DL
TRIGL SERPL-MCNC: 220 MG/DL
TSH SERPL-ACNC: 1.48 UIU/ML
UROBILINOGEN URINE: 0.2 MG/DL
WHITE BLOOD CELLS URINE: 1 /HPF

## 2023-05-30 ENCOUNTER — NON-APPOINTMENT (OUTPATIENT)
Age: 68
End: 2023-05-30

## 2023-05-30 RX ORDER — ATORVASTATIN CALCIUM 40 MG/1
40 TABLET, FILM COATED ORAL DAILY
Qty: 90 | Refills: 1 | Status: ACTIVE | COMMUNITY
Start: 2019-09-08 | End: 1900-01-01

## 2023-06-05 ENCOUNTER — APPOINTMENT (OUTPATIENT)
Dept: VASCULAR SURGERY | Facility: CLINIC | Age: 68
End: 2023-06-05

## 2023-06-05 VITALS
WEIGHT: 165 LBS | DIASTOLIC BLOOD PRESSURE: 84 MMHG | BODY MASS INDEX: 31.15 KG/M2 | HEIGHT: 61 IN | RESPIRATION RATE: 16 BRPM | HEART RATE: 73 BPM | SYSTOLIC BLOOD PRESSURE: 146 MMHG | OXYGEN SATURATION: 98 %

## 2023-06-07 ENCOUNTER — RESULT REVIEW (OUTPATIENT)
Age: 68
End: 2023-06-07

## 2023-06-07 ENCOUNTER — APPOINTMENT (OUTPATIENT)
Dept: CT IMAGING | Facility: CLINIC | Age: 68
End: 2023-06-07
Payer: MEDICARE

## 2023-06-07 ENCOUNTER — APPOINTMENT (OUTPATIENT)
Dept: MAMMOGRAPHY | Facility: CLINIC | Age: 68
End: 2023-06-07
Payer: MEDICARE

## 2023-06-07 PROCEDURE — 77063 BREAST TOMOSYNTHESIS BI: CPT

## 2023-06-07 PROCEDURE — 77067 SCR MAMMO BI INCL CAD: CPT

## 2023-06-07 PROCEDURE — 71275 CT ANGIOGRAPHY CHEST: CPT

## 2023-06-19 ENCOUNTER — APPOINTMENT (OUTPATIENT)
Dept: VASCULAR SURGERY | Facility: CLINIC | Age: 68
End: 2023-06-19
Payer: MEDICARE

## 2023-06-19 VITALS
SYSTOLIC BLOOD PRESSURE: 143 MMHG | WEIGHT: 165 LBS | HEIGHT: 61 IN | BODY MASS INDEX: 31.15 KG/M2 | DIASTOLIC BLOOD PRESSURE: 80 MMHG | HEART RATE: 59 BPM

## 2023-06-19 PROCEDURE — 99203 OFFICE O/P NEW LOW 30 MIN: CPT

## 2023-06-19 NOTE — ASSESSMENT
[FreeTextEntry1] : 68yo female with small CUONG that has NOT changed in size since last imaging; no symptoms and no imaging high risk features\par -will plan for surveillance CTA in 1 year and follow up in 1 year\par -continue monitoring BP (states always normal)

## 2023-06-19 NOTE — HISTORY OF PRESENT ILLNESS
[FreeTextEntry1] : 68yo female with PMH preDM who on a CTA chest during COVID had an incidental finding of a CUONG in the descending aorta. She denies any chest pain, back pain (new or different) and any other new complains. She does not heartburn and excessive drooling while asleep (but these are not related). She was previously seen by Dr. Gonzalez but she never followed up.

## 2023-08-08 ENCOUNTER — APPOINTMENT (OUTPATIENT)
Dept: RADIOLOGY | Facility: CLINIC | Age: 68
End: 2023-08-08
Payer: MEDICARE

## 2023-08-08 PROCEDURE — 73620 X-RAY EXAM OF FOOT: CPT | Mod: RT

## 2023-08-25 PROBLEM — J44.9 COPD (CHRONIC OBSTRUCTIVE PULMONARY DISEASE): Status: ACTIVE | Noted: 2018-02-13

## 2023-08-25 PROBLEM — A04.8 HELICOBACTER PYLORI INFECTION: Status: ACTIVE | Noted: 2018-10-12

## 2023-08-25 PROBLEM — I70.0 ATHEROSCLEROTIC ULCER OF AORTA: Status: ACTIVE | Noted: 2021-03-09

## 2023-08-29 ENCOUNTER — APPOINTMENT (OUTPATIENT)
Dept: GASTROENTEROLOGY | Facility: CLINIC | Age: 68
End: 2023-08-29
Payer: MEDICARE

## 2023-08-29 VITALS
HEIGHT: 61 IN | SYSTOLIC BLOOD PRESSURE: 134 MMHG | BODY MASS INDEX: 32.1 KG/M2 | WEIGHT: 170 LBS | OXYGEN SATURATION: 97 % | HEART RATE: 65 BPM | DIASTOLIC BLOOD PRESSURE: 84 MMHG

## 2023-08-29 DIAGNOSIS — K21.9 GASTRO-ESOPHAGEAL REFLUX DISEASE W/OUT ESOPHAGITIS: ICD-10-CM

## 2023-08-29 DIAGNOSIS — J44.9 CHRONIC OBSTRUCTIVE PULMONARY DISEASE, UNSPECIFIED: ICD-10-CM

## 2023-08-29 DIAGNOSIS — A04.8 OTHER SPECIFIED BACTERIAL INTESTINAL INFECTIONS: ICD-10-CM

## 2023-08-29 DIAGNOSIS — K22.10 ULCER OF ESOPHAGUS W/OUT BLEEDING: ICD-10-CM

## 2023-08-29 DIAGNOSIS — I71.9 ATHEROSCLEROSIS OF AORTA: ICD-10-CM

## 2023-08-29 DIAGNOSIS — Z12.11 ENCOUNTER FOR SCREENING FOR MALIGNANT NEOPLASM OF COLON: ICD-10-CM

## 2023-08-29 DIAGNOSIS — I70.0 ATHEROSCLEROSIS OF AORTA: ICD-10-CM

## 2023-08-29 DIAGNOSIS — Z83.79 FAMILY HISTORY OF OTHER DISEASES OF THE DIGESTIVE SYSTEM: ICD-10-CM

## 2023-08-29 DIAGNOSIS — Z12.12 ENCOUNTER FOR SCREENING FOR MALIGNANT NEOPLASM OF COLON: ICD-10-CM

## 2023-08-29 DIAGNOSIS — Z87.19 PERSONAL HISTORY OF OTHER DISEASES OF THE DIGESTIVE SYSTEM: ICD-10-CM

## 2023-08-29 PROCEDURE — 99205 OFFICE O/P NEW HI 60 MIN: CPT

## 2023-08-29 RX ORDER — OMEPRAZOLE 40 MG/1
40 CAPSULE, DELAYED RELEASE ORAL
Refills: 0 | Status: ACTIVE | COMMUNITY

## 2023-08-29 NOTE — HISTORY OF PRESENT ILLNESS
[FreeTextEntry1] : 68 y/o mother of two with hx of HLD, Obesity, COPD, ulcerative esophagitis, prior H.pylori, who presents for acid reflux symptoms and screening colonoscopy.   Says after eating she feels bloated - when sleep she has regurgitation into mouth - symptoms but getting worse.   Omeprazole 40 mg once a day - for a few months.   Has difficult swallowing vitamins.  Says she will see red blood on her pillow when wakes - almost every day since past couple of months - she has seen dentist - was told implant.   Says she takes Meloxicam because of bone pain.   Patient denies having painful swallowing, nausea, vomiting, loss of appetite, weight loss, melena and hematochezia.  Patient denies having abdominal pain, constipation, diarrhea.  Colonoscopy by Dr. Vidal on 12/2018:  Mild sigmoid diverticulosis. Believes a colon polyp removed - she does not have records with her today - she will get them. Recalls being told to have a colonoscopy in 5 years.   - EGD 2016 with Dr. Villa that showed LA class C esophagitis and a small hiatal hernia.  Father had pancreatic cancer at age 50. Maternal grandfather had liver cancer.  Patient denies family history of gastrointestinal cancers.  All other review of systems are negative.  Denies cardiac symptoms.

## 2023-08-29 NOTE — ASSESSMENT
[FreeTextEntry1] : IMPRESSION: #  GERD - previously on pantoprazole 40 mg BID, Omeprazole 40 mg once a day now -  Says after eating she feels bloated - when sleep she has regurgitation into mouth - symptoms but getting worse. -  Has difficult swallowing vitamins. -  Says she will see red blood on her pillow when wakes - almost every day since past couple of months  -  Says she takes Meloxicam because of bone pain. -  EGD 2016 with Dr. Villa that showed LA class C esophagitis and a small hiatal hernia.   #  Remote H. pylori - 3 years prior to 2018 - as per records only took antibiotics and not PPI   #  Due for a screening test for colon polyps/early-stage colon cancer -  CT scan of the A/P w/wo IV contrast on 1/2022:  Fatty liver, hiatal hernia, colon diverticulosis.   -  Colonoscopy by Dr. Vidal on 12/2018:  Mild sigmoid diverticulosis. Believes a colon polyp removed - she does not have records with her today - she will get them. Recalls being told to have a colonoscopy in 5 years.  #  Family history of GI cancer  -  Father had pancreatis - unclear if pancreatic cancer - she will find out - at age 50.  -  Maternal grandfather had liver cancer.   #  Comorbidities:  HLD, Obesity, COPD  PLAN:  I will plan for an upper endoscopy under monitored anesthesia care to rule out Erosive Reflux Disease, Fernando's Esophagus, assess integrity of anti-reflux barrier, exclude other diseases such as Eosinophilic Esophagitis, Achalasia, Cancer etc.   Risks, benefits, and alternatives of the procedure were discussed with the patient. Patient understands and agrees to proceed with the planned procedure.  I have advised the patient to arrange for a colonoscopy to rule out colon polyps, colorectal cancer etc. under monitored anesthesia care.  Risks such as perforation requiring surgery, colostomy, bleeding requiring blood transfusion, infection/sepsis, diverticulitis, colitis, missed colon cancer (2% to 6%), internal organ injury such as splenic hemorrhage (1/6000, risk thin, female gender) etc, adverse reaction to medication etc. and risks of anesthesia including cardiopulmonary compromise requiring ICU care were discussed with patient.  Alternatives were discussed.  Patient verbalized understanding and agrees to proceed with a colonoscopy under anesthesia.  She will need cardiology clearance for the procedures.   H. pylori breath - but would need omeprazole 2 weeks -   If father had pancreatic cancer she is at risk - Discussed screening for pancreatic cancer (yearly EUS alternating with MRI) - she qualifies if two members with one member being first degree relative or if she has abnormal genetics - discussed genetic testing - contact information given.   I had a long discussion with the patient with regards to fatty liver, and the natural progression to possible DANIELLE an ultimately cirrhosis, liver cancer.  I have discussed the importance of loosing wt with diet and exercise to improve fatty liver.  Yearly LFTs.

## 2023-09-06 ENCOUNTER — APPOINTMENT (OUTPATIENT)
Dept: INTERNAL MEDICINE | Facility: CLINIC | Age: 68
End: 2023-09-06

## 2023-10-23 DIAGNOSIS — K63.5 POLYP OF COLON: ICD-10-CM

## 2023-10-23 RX ORDER — SODIUM SULFATE, POTASSIUM SULFATE AND MAGNESIUM SULFATE 1.6; 3.13; 17.5 G/177ML; G/177ML; G/177ML
17.5-3.13-1.6 SOLUTION ORAL
Qty: 1 | Refills: 0 | Status: ACTIVE | COMMUNITY
Start: 2023-10-23 | End: 1900-01-01

## 2023-12-12 ENCOUNTER — APPOINTMENT (OUTPATIENT)
Dept: CARDIOLOGY | Facility: CLINIC | Age: 68
End: 2023-12-12
Payer: MEDICARE

## 2023-12-12 VITALS
SYSTOLIC BLOOD PRESSURE: 140 MMHG | BODY MASS INDEX: 31.53 KG/M2 | RESPIRATION RATE: 16 BRPM | WEIGHT: 167 LBS | HEIGHT: 61 IN | DIASTOLIC BLOOD PRESSURE: 86 MMHG

## 2023-12-12 DIAGNOSIS — Z01.818 ENCOUNTER FOR OTHER PREPROCEDURAL EXAMINATION: ICD-10-CM

## 2023-12-12 DIAGNOSIS — Z12.12 ENCOUNTER FOR SCREENING FOR MALIGNANT NEOPLASM OF COLON: ICD-10-CM

## 2023-12-12 DIAGNOSIS — E78.5 HYPERLIPIDEMIA, UNSPECIFIED: ICD-10-CM

## 2023-12-12 DIAGNOSIS — Z12.11 ENCOUNTER FOR SCREENING FOR MALIGNANT NEOPLASM OF COLON: ICD-10-CM

## 2023-12-12 PROCEDURE — 99204 OFFICE O/P NEW MOD 45 MIN: CPT

## 2023-12-12 PROCEDURE — 93000 ELECTROCARDIOGRAM COMPLETE: CPT

## 2023-12-12 RX ORDER — POLYETHYLENE GLYCOL 3350 AND ELECTROLYTES WITH LEMON FLAVOR 236; 22.74; 6.74; 5.86; 2.97 G/4L; G/4L; G/4L; G/4L; G/4L
236 POWDER, FOR SOLUTION ORAL
Qty: 1 | Refills: 0 | Status: DISCONTINUED | COMMUNITY
Start: 2023-08-29 | End: 2023-12-12

## 2023-12-12 RX ORDER — CALCIUM CARBONATE/VITAMIN D3 600 MG-20
600-800 TABLET ORAL
Qty: 180 | Refills: 1 | Status: DISCONTINUED | COMMUNITY
Start: 2019-09-17 | End: 2023-12-12

## 2023-12-12 RX ORDER — SODIUM SULFATE, POTASSIUM SULFATE AND MAGNESIUM SULFATE 1.6; 3.13; 17.5 G/177ML; G/177ML; G/177ML
17.5-3.13-1.6 SOLUTION ORAL
Qty: 1 | Refills: 0 | Status: ACTIVE | COMMUNITY
Start: 2023-12-12 | End: 1900-01-01

## 2023-12-22 ENCOUNTER — OUTPATIENT (OUTPATIENT)
Dept: OUTPATIENT SERVICES | Facility: HOSPITAL | Age: 68
LOS: 1 days | End: 2023-12-22
Payer: MEDICARE

## 2023-12-22 ENCOUNTER — APPOINTMENT (OUTPATIENT)
Dept: GASTROENTEROLOGY | Facility: HOSPITAL | Age: 68
End: 2023-12-22

## 2023-12-22 ENCOUNTER — RESULT REVIEW (OUTPATIENT)
Age: 68
End: 2023-12-22

## 2023-12-22 DIAGNOSIS — Z12.11 ENCOUNTER FOR SCREENING FOR MALIGNANT NEOPLASM OF COLON: ICD-10-CM

## 2023-12-22 DIAGNOSIS — K20.90 ESOPHAGITIS, UNSPECIFIED WITHOUT BLEEDING: ICD-10-CM

## 2023-12-22 DIAGNOSIS — K21.9 GASTRO-ESOPHAGEAL REFLUX DISEASE WITHOUT ESOPHAGITIS: ICD-10-CM

## 2023-12-22 PROCEDURE — 45385 COLONOSCOPY W/LESION REMOVAL: CPT

## 2023-12-22 PROCEDURE — 88305 TISSUE EXAM BY PATHOLOGIST: CPT | Mod: 26

## 2023-12-22 PROCEDURE — 45385 COLONOSCOPY W/LESION REMOVAL: CPT | Mod: PT

## 2023-12-22 PROCEDURE — 88313 SPECIAL STAINS GROUP 2: CPT

## 2023-12-22 PROCEDURE — 88312 SPECIAL STAINS GROUP 1: CPT | Mod: 26

## 2023-12-22 PROCEDURE — 88312 SPECIAL STAINS GROUP 1: CPT

## 2023-12-22 PROCEDURE — 43239 EGD BIOPSY SINGLE/MULTIPLE: CPT

## 2023-12-22 PROCEDURE — 88313 SPECIAL STAINS GROUP 2: CPT | Mod: 26

## 2023-12-22 PROCEDURE — 88305 TISSUE EXAM BY PATHOLOGIST: CPT

## 2023-12-22 RX ORDER — OMEPRAZOLE 40 MG/1
40 CAPSULE, DELAYED RELEASE ORAL
Qty: 30 | Refills: 3 | Status: ACTIVE | COMMUNITY
Start: 2023-12-22 | End: 1900-01-01

## 2023-12-27 LAB
SURGICAL PATHOLOGY STUDY: SIGNIFICANT CHANGE UP
SURGICAL PATHOLOGY STUDY: SIGNIFICANT CHANGE UP

## 2024-02-28 ENCOUNTER — APPOINTMENT (OUTPATIENT)
Dept: ORTHOPEDIC SURGERY | Facility: CLINIC | Age: 69
End: 2024-02-28
Payer: MEDICARE

## 2024-02-28 VITALS
BODY MASS INDEX: 31.53 KG/M2 | DIASTOLIC BLOOD PRESSURE: 74 MMHG | WEIGHT: 167 LBS | HEIGHT: 61 IN | SYSTOLIC BLOOD PRESSURE: 127 MMHG

## 2024-02-28 DIAGNOSIS — E66.9 OBESITY, UNSPECIFIED: ICD-10-CM

## 2024-02-28 DIAGNOSIS — M17.11 UNILATERAL PRIMARY OSTEOARTHRITIS, RIGHT KNEE: ICD-10-CM

## 2024-02-28 PROCEDURE — 20610 DRAIN/INJ JOINT/BURSA W/O US: CPT | Mod: RT

## 2024-02-28 PROCEDURE — G2211 COMPLEX E/M VISIT ADD ON: CPT

## 2024-02-28 PROCEDURE — 99204 OFFICE O/P NEW MOD 45 MIN: CPT | Mod: 25

## 2024-02-28 RX ORDER — MELOXICAM 15 MG/1
15 TABLET ORAL DAILY
Qty: 30 | Refills: 0 | Status: ACTIVE | COMMUNITY
Start: 2024-02-28 | End: 1900-01-01

## 2024-02-28 NOTE — PHYSICAL EXAM
[LE] : Sensory: Intact in bilateral lower extremities [ALL] : dorsalis pedis, posterior tibial, femoral, popliteal, and radial 2+ and symmetric bilaterally [de-identified] : GENERAL APPEARANCE: Well nourished and hydrated, pleasant, alert, and oriented x 3. Appears their stated age.  HEENT: Normocephalic, extraocular eye motion intact. Nasal septum midline. Oral cavity clear. External auditory canal clear.  RESPIRATORY: Breath sounds clear and audible in all lobes. No wheezing, No accessory muscle use. CARDIOVASCULAR: No apparent abnormalities. No lower leg edema. No varicosities. Pedal pulses are palpable. NEUROLOGIC: Sensation is normal, no muscle weakness in the upper or lower extremities. DERMATOLOGIC: No apparent skin lesions, moist, warm, no rash. SPINE: Cervical spine appears normal and moves freely; thoracic spine appears normal and moves freely; lumbosacral spine appears normal and moves freely, normal, nontender. MUSCULOSKELETAL: Hands, wrists, and elbows are normal and move freely, shoulders are normal and move freely.  [de-identified] : Right knee exam shows medial jointline tenderness, ROM 0-125 [de-identified] : MRI of the right knee done on 2/17/24 at  shows:  1. Moderate/advanced tricompartmental osteoarthritis with tearing of both menisci.   Outside Xrays of the right knee done at  shows advanced medial compartmental osteoarthritis

## 2024-02-28 NOTE — HISTORY OF PRESENT ILLNESS
[Worsening] : worsening [3] : a minimum pain level of 3/10 [6] : a current pain level of 6/10 [8] : a maximum pain level of 8/10 [de-identified] : Is a 68-year-old female here for initial evaluation of right knee pain.  Patient reports she has had pain for the past few months.  She recently had MRI and x-ray done of the knee showing tricompartmental osteoarthritis.  She did have recent cortisone and HA injections which only helped for about a week. The pt has tried Diclofenac and duloxetine with no relief. The pt does daily low impact exercise and takes OTC anti-inflammatories as needed.  She reports severe medial pain worse with ambulation stairs and exercise.

## 2024-02-28 NOTE — DISCUSSION/SUMMARY
[Medication Risks Reviewed] : Medication risks reviewed [Surgical risks reviewed] : Surgical risks reviewed [de-identified] : 69 y/o F pt presents with advanced medial compartmental osteoarthritis of the right knee. The nature of her condition and treatment options were discussed in detail. The pt is a future candidate for a right TKA. The surgery was discussed in detail. At this time, the pt is hesitant about surgery, despite failing over 3 months of conservative treatment such as cortisone injections, HA injections, and anti-inflammatories. However, she is considering surgery in the future. We discussed conservative treatment such as cortisone injections, HA injections, PT, anti-inflammatories, and low impact exercise. She should continue to do low impact exercises. We provided the pt with a right knee cortisone injection which she tolerated well. If the pt fails this conservative treatment, the pt will f/u to discuss surgery. She will talk with the surgical coordinator to talk with the surgical coordinator to plan a potential surgery date in-case she fails today's cortisone injection. The pt is losing her quality of life and she reports severe medial pain worse with ambulation stairs and exercise. Her pain is affecting her ADLs. We will pursue an xray at our next appointment for pre-surgical planning. Script for Mobic. The pt will f/u in 3 months for re-evaluation.   The patient is a 68 year year old individual with end stage arthritis of their right knee joint. The patient has exhausted a minimum of 3 months conservative treatment including prior injections (cortisone and/or hyaluronic acid injections), physical therapy, over the counter NSAIDS and pain medication where indicated.  In addition, the patient's quality of life is diminished due to significant chronic pain. The patient is having difficulty with activities of daily living, including ambulating, descending stairs, and rising from a seated position. Based upon the patients continued symptoms and failure to respond to conservative treatment, I have recommended a right total knee replacement for this patient. A long discussion took place with the patient describing what a total joint replacement is and what the procedure would entail. A knee model, similar to the implant that will be used during the operation, was utilized to demonstrate and to discuss the various bearing surfaces of the implants. Implant fixation, use of cement, was also discussed with the patient. Choices of implant manufacturers, mainly [employer] and [employer], were discussed and reviewed with preference to be made by patient and surgeon prior to operation. Final selection to be based on customary practice as well as preoperative templating with selection confirmed intraoperatively based on the patient's anatomy. The patient participated and agreed with the decision-making process. The hospitalization and post-operative care and rehabilitation were also discussed. The use of perioperative antibiotics and DVT prophylaxis were discussed. The risk, benefits and alternatives to a surgical intervention were discussed at length with the patient. The patient was also advised of risks related to the medical comorbidities and elevated body mass index (BMI). A lengthy discussion took place to review the most common complications including but not limited to: deep vein thrombosis, pulmonary embolism, heart attack, stroke, infection, wound breakdown, numbness, damage to nerves, tendon, muscles, arteries or other blood vessels, death and other possible complications from anesthesia. The patient was told that we will take steps to minimize these risks by using sterile technique, antibiotics and DVT prophylaxis when appropriate and follow the patient postoperatively in the office setting to monitor progress. The possibility of recurrent pain, no improvement in pain and actual worsening of pain were also discussed with the patient.    The discharge plan of care focused on the patient going home following surgery. The patient was encouraged to make the necessary arrangements to have someone stay with them when they are discharged home. Following discharge, a home care nurse will visit the patient. The home care nurse will open your home care case and request home physical therapy services. Home physical therapy will commence following discharge provided it is appropriate and covered by the health insurance benefit plan.   The benefits of surgery were discussed with the patient including the potential for improving his/her current clinical condition through operative intervention. Alternatives to surgical intervention including continued conservative management were also discussed in detail. All questions were answered to the satisfaction of the patient. The treatment plan of care, as well as a model of a total knee equivalent to the one that will be used for their total joint replacement, was shared with the patient. The patient participated and agreed to the plan of care as well as the use of the recommended implants for their total joint replacement surgery.

## 2024-02-28 NOTE — PROCEDURE
[de-identified] : I injected the patient's right knee today with cortisone for primary osteoarthritis.  I discussed at length with the patient the planned steroid and lidocaine injection. The risks, benefits, convalescence and alternatives were reviewed. The possible side effects discussed included but were not limited to: pain, swelling, heat, bleeding, and redness. Symptoms are generally mild but if they are extensive then contact the office. Giving pain relievers by mouth such as NSAIDs or Tylenol can generally treat the reactions to steroid and lidocaine. Rare cases of infection have been noted. Rash, hives and itching may occur post injection. If you have muscle pain or cramps, flushing and or swelling of the face, rapid heart beat, nausea, dizziness, fever, chills, headache, difficulty breathing, swelling in the arms or legs, or have a prickly feeling of your skin, contact a health care provider immediately. Following this discussion, the knee was prepped with Alcohol and under sterile condition the 80 mg Depo-Medrol and 6 cc Lidocaine injection was performed with a 20 gauge needle through a superolateral injection site. The needle was introduced into the joint, aspiration was performed to ensure intra-articular placement and the medication was injected. Upon withdrawal of the needle the site was cleaned with alcohol and a band aid applied. The patient tolerated the injection well and there were no adverse effects. Post injection instructions included no strenuous activity for 24 hours, cryotherapy and if there are any adverse effects to contact the office.

## 2024-03-06 RX ORDER — CELECOXIB 200 MG/1
200 CAPSULE ORAL
Qty: 42 | Refills: 1 | Status: ACTIVE | COMMUNITY
Start: 2024-03-06 | End: 1900-01-01

## 2024-05-15 ENCOUNTER — APPOINTMENT (OUTPATIENT)
Dept: CARDIOLOGY | Facility: CLINIC | Age: 69
End: 2024-05-15

## 2024-05-29 ENCOUNTER — APPOINTMENT (OUTPATIENT)
Dept: ORTHOPEDIC SURGERY | Facility: CLINIC | Age: 69
End: 2024-05-29

## 2024-08-01 ENCOUNTER — APPOINTMENT (OUTPATIENT)
Dept: GASTROENTEROLOGY | Facility: CLINIC | Age: 69
End: 2024-08-01
Payer: MEDICARE

## 2024-08-01 VITALS
WEIGHT: 165 LBS | HEIGHT: 62 IN | BODY MASS INDEX: 30.36 KG/M2 | SYSTOLIC BLOOD PRESSURE: 144 MMHG | OXYGEN SATURATION: 97 % | HEART RATE: 81 BPM | DIASTOLIC BLOOD PRESSURE: 83 MMHG

## 2024-08-01 DIAGNOSIS — K20.90 ESOPHAGITIS, UNSPECIFIED WITHOUT BLEEDING: ICD-10-CM

## 2024-08-01 PROCEDURE — 99214 OFFICE O/P EST MOD 30 MIN: CPT

## 2024-08-01 RX ORDER — OMEPRAZOLE 40 MG/1
40 CAPSULE, DELAYED RELEASE ORAL
Qty: 60 | Refills: 3 | Status: ACTIVE | COMMUNITY
Start: 2024-08-01 | End: 1900-01-01

## 2024-08-01 NOTE — ASSESSMENT
[FreeTextEntry1] : IMPRESSION: # Grade 1 esophagitis - Omeprazole 40 mg once a day now -  Says after eating she feels bloated - when sleep she has regurgitation into mouth - symptoms but getting worse. -  Has difficult swallowing vitamins. -  Says she will see red blood on her pillow when wakes - almost every day since past couple of months - realized it was her teeth.  - Says she takes Meloxicam because of bone pain. -  EGD by Dr. Harris on 12/2023:  Grade 1 esophagitis (Reflux changes with BE). Erythema of the stomach s/p bx (Neg for HP, and IM). Nml duodenum s/p bx (neg for celiac).  Continue omeprazole once a day  - EGD 2016 with Dr. Villa that showed LA class C esophagitis and a small hiatal hernia.  # Remote H. pylori - 3 years prior to 2018 - as per records only took antibiotics and not PPI  #  Low risk adenoma of the colon -  Colonoscopy by Dr. Harris on 12/2023:  One small cecal polyp removed (TA).  One small rectal polyp removed (hyperplastic polyp).  Rpt 5 yrs.  - CT scan of the A/P w/wo IV contrast on 1/2022: Fatty liver, hiatal hernia, colon diverticulosis. - Colonoscopy by Dr. Vidal on 12/2018: Mild sigmoid diverticulosis. Believes a colon polyp removed - she does not have records with her today - she will get them. Recalls being told to have a colonoscopy in 5 years.  # Family history of GI cancer - Father had pancreatis - unclear if pancreatic cancer - she will find out - at age 50. - Maternal grandfather had liver cancer.  # Comorbidities: HLD, Obesity, COPD  PLAN: Omeprazole 40 mg twice a day for 2 month (30 min before breakfast and 30 min before dinner), then she will follow up if ongoing symptoms, then esophageal manometry.     H. pylori breath test - but needs to be off of omeprazole two weeks prior to breath test.   If father had pancreatic cancer she is at risk - Discussed screening for pancreatic cancer (yearly EUS alternating with MRI) - she qualifies if two members with one member being first degree relative or if she has abnormal genetics - discussed genetic testing - contact information given.

## 2024-08-01 NOTE — HISTORY OF PRESENT ILLNESS
[FreeTextEntry1] : 67 y/o mother of two with hx of HLD, Obesity, COPD, ulcerative esophagitis, prior H.pylori, who presents for follow up.   Says has chest pain after eating - says regurgitates, feels better with tums, mylanta -  Patient denies having difficulty swallowing, painful swallowing, nausea, vomiting, loss of appetite, weight loss, melena and hematochezia.  Says has been taking omeprazole 40 mg once a day before breakfast.     Initial visit 8/2023:   Says after eating she feels bloated - when sleep she has regurgitation into mouth - symptoms but getting worse.  Omeprazole 40 mg once a day - for a few months.  Has difficult swallowing vitamins. Says she will see red blood on her pillow when wakes - almost every day since past couple of months - she has seen dentist - was told implant. Says she takes Meloxicam because of bone pain.  Patient denies having painful swallowing, nausea, vomiting, loss of appetite, weight loss, melena and hematochezia.  Patient denies having abdominal pain, constipation, diarrhea.  Colonoscopy by Dr. Vidal on 12/2018: Mild sigmoid diverticulosis. Believes a colon polyp removed - she does not have records with her today - she will get them. Recalls being told to have a colonoscopy in 5 years.  - EGD 2016 with Dr. Villa that showed LA class C esophagitis and a small hiatal hernia.  Father had pancreatic cancer at age 50. Maternal grandfather had liver cancer. Patient denies family history of gastrointestinal cancers.

## 2024-09-23 ENCOUNTER — APPOINTMENT (OUTPATIENT)
Dept: ORTHOPEDIC SURGERY | Facility: CLINIC | Age: 69
End: 2024-09-23
Payer: MEDICARE

## 2024-09-23 VITALS
HEIGHT: 62 IN | WEIGHT: 165 LBS | DIASTOLIC BLOOD PRESSURE: 82 MMHG | SYSTOLIC BLOOD PRESSURE: 144 MMHG | BODY MASS INDEX: 30.36 KG/M2 | HEART RATE: 78 BPM

## 2024-09-23 DIAGNOSIS — E66.9 OBESITY, UNSPECIFIED: ICD-10-CM

## 2024-09-23 DIAGNOSIS — M17.11 UNILATERAL PRIMARY OSTEOARTHRITIS, RIGHT KNEE: ICD-10-CM

## 2024-09-23 DIAGNOSIS — R73.03 PREDIABETES.: ICD-10-CM

## 2024-09-23 PROCEDURE — 73564 X-RAY EXAM KNEE 4 OR MORE: CPT | Mod: RT

## 2024-09-23 PROCEDURE — 99214 OFFICE O/P EST MOD 30 MIN: CPT | Mod: 25

## 2024-09-23 PROCEDURE — 20610 DRAIN/INJ JOINT/BURSA W/O US: CPT | Mod: RT

## 2024-09-23 NOTE — PHYSICAL EXAM
[LE] : Sensory: Intact in bilateral lower extremities [ALL] : dorsalis pedis, posterior tibial, femoral, popliteal, and radial 2+ and symmetric bilaterally [de-identified] : GENERAL APPEARANCE: Well nourished and hydrated, pleasant, alert, and oriented x 3. Appears their stated age.  HEENT: Normocephalic, extraocular eye motion intact. Nasal septum midline. Oral cavity clear. External auditory canal clear.  RESPIRATORY: Breath sounds clear and audible in all lobes. No wheezing, No accessory muscle use. CARDIOVASCULAR: No apparent abnormalities. No lower leg edema. No varicosities. Pedal pulses are palpable. NEUROLOGIC: Sensation is normal, no muscle weakness in the upper or lower extremities. DERMATOLOGIC: No apparent skin lesions, moist, warm, no rash. SPINE: Cervical spine appears normal and moves freely; thoracic spine appears normal and moves freely; lumbosacral spine appears normal and moves freely, normal, nontender. MUSCULOSKELETAL: Hands, wrists, and elbows are normal and move freely, shoulders are normal and move freely.   Examination of the right knee reveals no joint effusion.  There is focal tenderness of the posterior medial joint line and pes bursa.  Range of motion is 0 to 120 degrees of flexion with pain at terminal range.  Positive patellofemoral grind.  No gross instability.  5 out of 5 strength. [de-identified] : New x-rays were obtained today the patient's right knee, AP, lateral, sunrise and tunnel views.  There is evidence of advanced medial compartment osteoarthritis with varus deformity.  Moderate arthritis in the patellofemoral compartment.  No fracture or dislocation.

## 2024-09-23 NOTE — DISCUSSION/SUMMARY
[Medication Risks Reviewed] : Medication risks reviewed [Surgical risks reviewed] : Surgical risks reviewed [de-identified] : 70 y/o F pt presents with advanced medial compartmental osteoarthritis of the right knee. The nature of her condition and treatment options were discussed in detail. The pt is a future candidate for a right TKA. The surgery was discussed in detail. At this time, the pt is hesitant about surgery, despite failing over 3 months of conservative treatment such as cortisone injections, HA injections, and anti-inflammatories. We have agreed to continue with conservative management.  We did proceed with a repeat cortisone injection for the right knee in the office today.  She will continue with a home exercise program.  She will take oral meloxicam as needed for discomfort.  Follow-up in 3 months for reevaluation. She was on board with the plan.  All questions answered.

## 2024-09-23 NOTE — HISTORY OF PRESENT ILLNESS
[Worsening] : worsening [6] : a current pain level of 6/10 [3] : a minimum pain level of 3/10 [8] : a maximum pain level of 8/10 [de-identified] : Usha is a 69-year-old female does present today for evaluation of right knee pain.  She was last seen back in February 2024.  She reports more improvement of symptoms following a cortisone injection given at the time.  She denies any recent injury.  She presents today with complaints of intermittent, moderate dull aching pain localized to the medial aspect of the knee.  Exacerbated walking, standing and deep knee bending, most pronounced with stairs.  She does note some painful crepitus. No catching, locking or buckling.  No radicular pain or paresthesia. She is interested in pursuing a repeat cortisone injection.  BMI of 30.  She is a prediabetic.  No history of use of blood thinners.  No history of sleep apnea.

## 2024-09-23 NOTE — PROCEDURE
[de-identified] : I injected the RIGHT knee.  I discussed at length with the patient the planned steroid and lidocaine injection. The risks, benefits, convalescence and alternatives were reviewed. The possible side effects discussed included but were not limited to: pain, swelling, heat, bleeding, and redness. Symptoms are generally mild but if they are extensive then contact the office. Giving pain relievers by mouth such as NSAIDs or Tylenol can generally treat the reactions to steroid and lidocaine. Rare cases of infection have been noted. Rash, hives and itching may occur post injection. If you have muscle pain or cramps, flushing and or swelling of the face, rapid heart beat, nausea, dizziness, fever, chills, headache, difficulty breathing, swelling in the arms or legs, or have a prickly feeling of your skin, contact a health care provider immediately. Following this discussion, the knee was prepped with Alcohol and under sterile condition the 80 mg Depo-Medrol and 6 cc Lidocaine injection was performed with a 20 gauge needle through a infralateral injection site. The needle was introduced into the joint, aspiration was performed to ensure intra-articular placement and the medication was injected. Upon withdrawal of the needle the site was cleaned with alcohol and a band aid applied. The patient tolerated the injection well and there were no adverse effects. Post injection instructions included no strenuous activity for 24 hours, cryotherapy and if there are any adverse effects to contact the office.

## 2024-09-23 NOTE — REASON FOR VISIT
Subjective:    Aracelis Muñoz is a 67 year old female with a lumbar condition.  Condition occurred with:  Insidious onset.  Condition occurred: during recreation/sport.  This is a recurrent condition  Patient is referred with c/o LBP, L>R and L buttock pain which began after participating in Pilates class in October 2016. PMH includes previous bout of L sided sciatica which responded well to epidural injection. Sxs are aggravated by prolonged sitting and she notes more pain in the morning. Better with walking..    Patient reports pain:  Lumbar spine left and central lumbar spine.  Radiates to:  Gluteals left and thigh left.  Pain is described as aching and is intermittent and reported as 7/10.   Pain is worse in the A.M..  Symptoms are exacerbated by twisting and sitting and relieved by activity/movement.  Since onset symptoms are unchanged.    Previous treatment includes chiropractic.  There was moderate improvement following previous treatment.  General health as reported by patient is good.            Patient is working in normal job without restrictions.  Primary job tasks include:  Prolonged sitting.        Red flags:  None as reported by the patient.  Aracelis Muñoz is a 67 year old female with a right knee condition.  Condition occurred with:  A fall/slip and degenerative joint disease.  Condition occurred: in the community.  This is a new condition  Patient is referred with c/o R knee pain. She fell on the R knee in September 2016 and has noted increased pain since. PMH includes R knee pain which responded well to both HC and Synvisc injections. Notes increased pain when descending stairs..    Patient reports pain:  Anterior, lateral, medial and in the joint.    Pain is described as aching and is intermittent and reported as 7/10.  Associated symptoms:  Loss of motion/stiffness and edema. Pain is worse during the day.  Symptoms are exacerbated by bending/squatting and descending stairs and relieved by  activity/movement.  Since onset symptoms are unchanged.        General health as reported by patient is good.                                            Objective:    System                                                Knee Evaluation:  ROM:  Strength wnl knee: bilateral hip abduction weakness noted.    PROM      Extension: Left: 1    Right:  5  Flexion: Left: 146    Right:  145    Endfeel: springy with pain  Strength:     Extension:  Left: 5/5   Pain:      Right: 5/5   Pain:  Flexion:  Left: 5/5   Pain:      Right: 5/5   Pain:    Quad Set Left: WNL    Pain:   Quad Set Right: WNL    Pain:  Ligament Testing:  Normal                Special Tests: Normal      Palpation:  Normal      Edema:  Normal      Functional Testing:  not assessed                      [facility] Lumbar Evaluation    Posture:  Sitting: fair  Standing: good  Lordosis: WNL  Lateral Shift: no  Correction of Posture: no effect    Movement Loss:  Flexion (Flex): nil  Extension (EXT): nil  Side Glide R (SG R): nil  Side Glide L (SG L): min and pain  Test Movements:  FIS: During: no effect  After: no effect  Mechanical Response: no effectPretest Movements: no pain  Repeat FIS: During: no effect  After: no effect  Mechanical Response: no effect  EIS: During: no effect  After: no effect  Mechanical Response: no effect  Repeat EIS: During: no effect  After: no effect  Mechanical Response: no effect  KEN: During: no effect  After: no effect  Mechanical Response: no effect  Repeat KEN: During: no effect  After: no effect  Mechanical Response: no effect  EIL: During: no effect  After: no effect  Mechanical Response: no effect  Repeat EIL: During: no effect  After: no effect  Mechanical Response: no effect        Conclusion: posture (possible DDD)  Principle of Treatment:    Flexion: KEN and FISitting x 10/ 3 x daily  Extension: EIL/EIS x 10/ 3 x daily    Other: piriformis and hip ER stretch in sitting and supine                                        ROS    Assessment/Plan:      Patient is a 67 year old female with lumbar and right side knee complaints.    Patient has the following significant findings with corresponding treatment plan.                Diagnosis 1:  LBP, R knee pain  Pain -  hot/cold therapy, manual therapy, self management, education and home program  Decreased joint mobility - manual therapy, therapeutic exercise and home program  Decreased strength - therapeutic exercise, therapeutic activities and home program  Impaired muscle performance - neuro re-education and home program  Decreased function - therapeutic activities and home program    Therapy Evaluation Codes:   1) History comprised of:   Personal factors that impact the plan of care:      None.    Comorbidity factors that impact the plan of care are:      None.     Medications impacting care: None.  2) Examination of Body Systems comprised of:   Body structures and functions that impact the plan of care:      Knee and Lumbar spine.   Activity limitations that impact the plan of care are:      Sitting, Squatting/kneeling and Stairs.  3) Clinical presentation characteristics are:   Stable/Uncomplicated.  4) Decision-Making    Low complexity using standardized patient assessment instrument and/or measureable assessment of functional outcome.  Cumulative Therapy Evaluation is: Low complexity.    Previous and current functional limitations:  (See Goal Flow Sheet for this information)    Short term and Long term goals: (See Goal Flow Sheet for this information)     Communication ability:  Patient appears to be able to clearly communicate and understand verbal and written communication and follow directions correctly.  Treatment Explanation - The following has been discussed with the patient:   RX ordered/plan of care  Anticipated outcomes  Possible risks and side effects  This patient would benefit from PT intervention to resume normal activities.   Rehab potential is  excellent.    Frequency:  1-2 X week, once daily  Duration:  for 3 weeks  Discharge Plan:  Achieve all LTG.  Independent in home treatment program.  Reach maximal therapeutic benefit.    Please refer to the daily flowsheet for treatment today, total treatment time and time spent performing 1:1 timed codes.            [Follow-Up Visit] : a follow-up visit for [FreeTextEntry2] : RIGHT knee pain

## 2024-10-11 NOTE — HISTORY OF PRESENT ILLNESS
[FreeTextEntry1] : This patient is here for evaluation of microhematuria noted on a routine determination.
98

## 2024-12-23 ENCOUNTER — APPOINTMENT (OUTPATIENT)
Dept: ORTHOPEDIC SURGERY | Facility: CLINIC | Age: 69
End: 2024-12-23

## 2025-01-14 ENCOUNTER — APPOINTMENT (OUTPATIENT)
Dept: GASTROENTEROLOGY | Facility: CLINIC | Age: 70
End: 2025-01-14
Payer: MEDICARE

## 2025-01-14 VITALS
BODY MASS INDEX: 29.26 KG/M2 | DIASTOLIC BLOOD PRESSURE: 85 MMHG | HEART RATE: 86 BPM | WEIGHT: 159 LBS | HEIGHT: 62 IN | OXYGEN SATURATION: 95 % | SYSTOLIC BLOOD PRESSURE: 134 MMHG

## 2025-01-14 DIAGNOSIS — R13.10 DYSPHAGIA, UNSPECIFIED: ICD-10-CM

## 2025-01-14 DIAGNOSIS — K21.9 GASTRO-ESOPHAGEAL REFLUX DISEASE W/OUT ESOPHAGITIS: ICD-10-CM

## 2025-01-14 DIAGNOSIS — K20.90 ESOPHAGITIS, UNSPECIFIED WITHOUT BLEEDING: ICD-10-CM

## 2025-01-14 PROCEDURE — 99214 OFFICE O/P EST MOD 30 MIN: CPT

## 2025-01-14 RX ORDER — SEMAGLUTIDE 1.34 MG/ML
INJECTION, SOLUTION SUBCUTANEOUS
Refills: 0 | Status: ACTIVE | COMMUNITY

## 2025-01-20 ENCOUNTER — NON-APPOINTMENT (OUTPATIENT)
Age: 70
End: 2025-01-20

## 2025-01-29 ENCOUNTER — APPOINTMENT (OUTPATIENT)
Dept: GASTROENTEROLOGY | Facility: CLINIC | Age: 70
End: 2025-01-29
Payer: MEDICARE

## 2025-01-29 DIAGNOSIS — R07.89 OTHER CHEST PAIN: ICD-10-CM

## 2025-01-29 DIAGNOSIS — R13.10 DYSPHAGIA, UNSPECIFIED: ICD-10-CM

## 2025-01-29 DIAGNOSIS — K20.90 ESOPHAGITIS, UNSPECIFIED WITHOUT BLEEDING: ICD-10-CM

## 2025-01-29 PROCEDURE — 99213 OFFICE O/P EST LOW 20 MIN: CPT | Mod: 93

## 2025-02-03 ENCOUNTER — TRANSCRIPTION ENCOUNTER (OUTPATIENT)
Age: 70
End: 2025-02-03

## 2025-02-03 ENCOUNTER — RESULT REVIEW (OUTPATIENT)
Age: 70
End: 2025-02-03

## 2025-02-03 ENCOUNTER — OUTPATIENT (OUTPATIENT)
Dept: OUTPATIENT SERVICES | Facility: HOSPITAL | Age: 70
LOS: 1 days | End: 2025-02-03
Payer: MEDICARE

## 2025-02-03 ENCOUNTER — APPOINTMENT (OUTPATIENT)
Dept: GASTROENTEROLOGY | Facility: HOSPITAL | Age: 70
End: 2025-02-03

## 2025-02-03 DIAGNOSIS — K21.9 GASTRO-ESOPHAGEAL REFLUX DISEASE WITHOUT ESOPHAGITIS: ICD-10-CM

## 2025-02-03 PROCEDURE — 43239 EGD BIOPSY SINGLE/MULTIPLE: CPT

## 2025-02-03 PROCEDURE — 88312 SPECIAL STAINS GROUP 1: CPT | Mod: 26

## 2025-02-03 PROCEDURE — 88312 SPECIAL STAINS GROUP 1: CPT

## 2025-02-03 PROCEDURE — 88313 SPECIAL STAINS GROUP 2: CPT

## 2025-02-03 PROCEDURE — 88313 SPECIAL STAINS GROUP 2: CPT | Mod: 26

## 2025-02-03 PROCEDURE — 88305 TISSUE EXAM BY PATHOLOGIST: CPT

## 2025-02-03 PROCEDURE — 88305 TISSUE EXAM BY PATHOLOGIST: CPT | Mod: 26

## 2025-02-03 DEVICE — ESOPHAGEAL BALLOON CATH CRE FIXED WIRE 6-7-8MM: Type: IMPLANTABLE DEVICE | Status: FUNCTIONAL

## 2025-02-03 DEVICE — HEMOSPRAY HEMOSTAT ENDO 7F: Type: IMPLANTABLE DEVICE | Status: FUNCTIONAL

## 2025-02-04 LAB — SURGICAL PATHOLOGY STUDY: SIGNIFICANT CHANGE UP

## 2025-05-14 ENCOUNTER — APPOINTMENT (OUTPATIENT)
Dept: ORTHOPEDIC SURGERY | Facility: CLINIC | Age: 70
End: 2025-05-14
Payer: MEDICARE

## 2025-05-14 VITALS — WEIGHT: 159 LBS | HEIGHT: 62 IN | BODY MASS INDEX: 29.26 KG/M2

## 2025-05-14 DIAGNOSIS — M20.12 HALLUX VALGUS (ACQUIRED), LEFT FOOT: ICD-10-CM

## 2025-05-14 DIAGNOSIS — M20.42 OTHER HAMMER TOE(S) (ACQUIRED), LEFT FOOT: ICD-10-CM

## 2025-05-14 PROCEDURE — 99214 OFFICE O/P EST MOD 30 MIN: CPT | Mod: 57

## 2025-05-14 PROCEDURE — 73630 X-RAY EXAM OF FOOT: CPT | Mod: LT

## 2025-07-24 RX ORDER — HYDROCODONE BITARTRATE AND ACETAMINOPHEN 5; 325 MG/1; MG/1
5-325 TABLET ORAL
Qty: 20 | Refills: 0 | Status: ACTIVE | COMMUNITY
Start: 2025-07-24 | End: 1900-01-01

## 2025-07-28 ENCOUNTER — APPOINTMENT (OUTPATIENT)
Age: 70
End: 2025-07-28
Payer: MEDICARE

## 2025-07-28 PROCEDURE — 29898 ANKLE ARTHROSCOPY/SURGERY: CPT | Mod: TA

## 2025-07-28 PROCEDURE — 28010 INCISION OF TOE TENDON: CPT | Mod: T1

## 2025-07-28 PROCEDURE — 28232 INCISION OF TOE TENDON: CPT | Mod: T1

## 2025-07-28 PROCEDURE — 64450 NJX AA&/STRD OTHER PN/BRANCH: CPT | Mod: 59,TA

## 2025-07-28 PROCEDURE — 28285 REPAIR OF HAMMERTOE: CPT | Mod: T1

## 2025-07-28 PROCEDURE — 73630 X-RAY EXAM OF FOOT: CPT | Mod: 26

## 2025-08-05 ENCOUNTER — APPOINTMENT (OUTPATIENT)
Dept: ORTHOPEDIC SURGERY | Facility: CLINIC | Age: 70
End: 2025-08-05
Payer: MEDICARE

## 2025-08-05 DIAGNOSIS — M20.42 OTHER HAMMER TOE(S) (ACQUIRED), LEFT FOOT: ICD-10-CM

## 2025-08-05 DIAGNOSIS — M20.12 HALLUX VALGUS (ACQUIRED), LEFT FOOT: ICD-10-CM

## 2025-08-05 PROCEDURE — 99024 POSTOP FOLLOW-UP VISIT: CPT

## 2025-08-05 PROCEDURE — 73630 X-RAY EXAM OF FOOT: CPT | Mod: LT

## 2025-08-20 ENCOUNTER — APPOINTMENT (OUTPATIENT)
Dept: ORTHOPEDIC SURGERY | Facility: CLINIC | Age: 70
End: 2025-08-20
Payer: MEDICARE

## 2025-08-20 VITALS — BODY MASS INDEX: 29.26 KG/M2 | WEIGHT: 159 LBS | HEIGHT: 62 IN

## 2025-08-20 DIAGNOSIS — M20.42 OTHER HAMMER TOE(S) (ACQUIRED), LEFT FOOT: ICD-10-CM

## 2025-08-20 DIAGNOSIS — M20.12 HALLUX VALGUS (ACQUIRED), LEFT FOOT: ICD-10-CM

## 2025-08-20 PROCEDURE — 99024 POSTOP FOLLOW-UP VISIT: CPT

## 2025-08-20 PROCEDURE — 73630 X-RAY EXAM OF FOOT: CPT | Mod: LT

## (undated) DEVICE — RADIAL JAW 4 LG CAPACITY WITH NDL

## (undated) DEVICE — CATH IV SAFE BC 20G X 1.16" (PINK)

## (undated) DEVICE — SENSOR O2 FINGER XL ADULT 24/BX 6BX/CA

## (undated) DEVICE — TRAP QUICK CATCH  SINGL CHAMBER

## (undated) DEVICE — MASK LRG MED AND HIGH O2 CONC M TO M 10FT

## (undated) DEVICE — SYR LUER SLIP TIP 50CC

## (undated) DEVICE — RETRIEVER ROTH NET PLATINUM-UNIVERSAL

## (undated) DEVICE — SYR ALLIANCE II INFLATION 60ML

## (undated) DEVICE — CATH ELECHMSTAT  INJ 7FR 210CM

## (undated) DEVICE — TUBING SUCTION CONN 6FT STERILE

## (undated) DEVICE — TUBING CANNULA SALTER LABS NASAL ADULT 7FT

## (undated) DEVICE — SOL IRR POUR H2O 500ML

## (undated) DEVICE — BRUSH COLONOSCOPY CYTOLOGY

## (undated) DEVICE — VALVE BIOPSY

## (undated) DEVICE — MASK O2 NON REBREATH 3IN1 ADULT

## (undated) DEVICE — PACK IV START WITH CHG

## (undated) DEVICE — TUBING IV SET SECOND 34" W/O LOK-BLUNT

## (undated) DEVICE — TUBING IV SET GRAVITY 3Y 100" MACRO

## (undated) DEVICE — TUBING IV SET SECONDARY 34"

## (undated) DEVICE — TUBE RECTAL 24FR

## (undated) DEVICE — FORMALIN CUPS 10% BUFFERED

## (undated) DEVICE — FORCEP RADIAL JAW 4 W NDL 2.2MM 2.8MM 160CM YELLOW DISP

## (undated) DEVICE — STERIS DEFENDO 3-PIECE KIT (AIR/WATER, SUCTION & BIOPSY VALVES)

## (undated) DEVICE — MARKER ENDO SPOT EX

## (undated) DEVICE — TUBE O2 SUPL CRUSH RESIS CONN SOUTHSIDE ONLY

## (undated) DEVICE — CATH IV SAFE BC 22G X 1" (BLUE)

## (undated) DEVICE — SUCTION YANKAUER TAPERED BULBOUS NO VENT

## (undated) DEVICE — ELCTR GROUNDING PAD ADULT COVIDIEN

## (undated) DEVICE — Device

## (undated) DEVICE — FORCEP RADIAL JAW 4 W NDL 2.4MM 2.8MM 240CM ORANGE DISP

## (undated) DEVICE — SYR IV POSIFLUSH NS 3ML 30/TY

## (undated) DEVICE — DRSG CURITY GAUZE SPONGE 4 X 4" 12-PLY

## (undated) DEVICE — NDL INJ SCLERO INTERJECT 23G

## (undated) DEVICE — BITE BLOCK MAXI RUBBER STAMP

## (undated) DEVICE — BRUSH CYTO ENDO

## (undated) DEVICE — SNARE POLYP SENS 27MM 240CM

## (undated) DEVICE — CANISTER SUCTION 1200CC 10/SL

## (undated) DEVICE — SUT HEWSON RETRIEVER

## (undated) DEVICE — POLY TRAP ETRAP

## (undated) DEVICE — SNARE LRG

## (undated) DEVICE — ENDOCUFF VISION SZ 3 SM PRPL

## (undated) DEVICE — ENDOCUFF VISION SZ 2 LG GRN

## (undated) DEVICE — SYR LUER SLIP TIP 30CC

## (undated) DEVICE — SNARE CAPTIVATOR II RND COLD 10MM

## (undated) DEVICE — FORCEP RADIAL JAW 4 JUMBO 2.8MM 3.2MM 240CM ORANGE DISP